# Patient Record
Sex: FEMALE | Race: WHITE | URBAN - METROPOLITAN AREA
[De-identification: names, ages, dates, MRNs, and addresses within clinical notes are randomized per-mention and may not be internally consistent; named-entity substitution may affect disease eponyms.]

---

## 2022-06-28 ENCOUNTER — OFFICE VISIT (OUTPATIENT)
Dept: FAMILY MEDICINE CLINIC | Facility: CLINIC | Age: 32
End: 2022-06-28
Payer: COMMERCIAL

## 2022-06-28 VITALS
BODY MASS INDEX: 26.84 KG/M2 | SYSTOLIC BLOOD PRESSURE: 120 MMHG | HEIGHT: 67 IN | WEIGHT: 171 LBS | DIASTOLIC BLOOD PRESSURE: 75 MMHG | HEART RATE: 64 BPM

## 2022-06-28 DIAGNOSIS — E11.9 TYPE 2 DIABETES MELLITUS WITHOUT COMPLICATION, WITH LONG-TERM CURRENT USE OF INSULIN (HCC): ICD-10-CM

## 2022-06-28 DIAGNOSIS — E03.9 ACQUIRED HYPOTHYROIDISM: ICD-10-CM

## 2022-06-28 DIAGNOSIS — Z76.89 ENCOUNTER TO ESTABLISH CARE: Primary | ICD-10-CM

## 2022-06-28 DIAGNOSIS — Z79.4 TYPE 2 DIABETES MELLITUS WITHOUT COMPLICATION, WITH LONG-TERM CURRENT USE OF INSULIN (HCC): ICD-10-CM

## 2022-06-28 PROCEDURE — 99203 OFFICE O/P NEW LOW 30 MIN: CPT | Performed by: NURSE PRACTITIONER

## 2022-06-28 PROCEDURE — 3078F DIAST BP <80 MM HG: CPT | Performed by: NURSE PRACTITIONER

## 2022-06-28 PROCEDURE — 3074F SYST BP LT 130 MM HG: CPT | Performed by: NURSE PRACTITIONER

## 2022-06-28 PROCEDURE — 3008F BODY MASS INDEX DOCD: CPT | Performed by: NURSE PRACTITIONER

## 2022-06-28 RX ORDER — INSULIN DETEMIR 100 [IU]/ML
25 INJECTION, SOLUTION SUBCUTANEOUS 2 TIMES DAILY
COMMUNITY
Start: 2022-04-28 | End: 2022-06-28

## 2022-06-28 RX ORDER — LEVOTHYROXINE SODIUM 0.07 MG/1
75 TABLET ORAL
COMMUNITY
End: 2022-06-28

## 2022-06-28 RX ORDER — LEVOTHYROXINE SODIUM 0.07 MG/1
75 TABLET ORAL
Qty: 30 TABLET | Refills: 0 | Status: SHIPPED | OUTPATIENT
Start: 2022-06-28

## 2022-06-28 RX ORDER — INSULIN DETEMIR 100 [IU]/ML
40 INJECTION, SOLUTION SUBCUTANEOUS 2 TIMES DAILY
Qty: 3 ML | Refills: 0 | Status: SHIPPED | OUTPATIENT
Start: 2022-06-28 | End: 2022-06-29

## 2022-06-28 RX ORDER — PEN NEEDLE, DIABETIC 31 GX3/16"
NEEDLE, DISPOSABLE MISCELLANEOUS
COMMUNITY
Start: 2022-04-28

## 2022-06-29 ENCOUNTER — TELEPHONE (OUTPATIENT)
Dept: FAMILY MEDICINE CLINIC | Facility: CLINIC | Age: 32
End: 2022-06-29

## 2022-06-29 DIAGNOSIS — Z76.89 ENCOUNTER TO ESTABLISH CARE: ICD-10-CM

## 2022-06-29 DIAGNOSIS — E11.9 TYPE 2 DIABETES MELLITUS WITHOUT COMPLICATION, WITH LONG-TERM CURRENT USE OF INSULIN (HCC): ICD-10-CM

## 2022-06-29 DIAGNOSIS — Z79.4 TYPE 2 DIABETES MELLITUS WITHOUT COMPLICATION, WITH LONG-TERM CURRENT USE OF INSULIN (HCC): ICD-10-CM

## 2022-06-29 RX ORDER — INSULIN DETEMIR 100 [IU]/ML
40 INJECTION, SOLUTION SUBCUTANEOUS 2 TIMES DAILY
Qty: 1 EACH | Refills: 0 | Status: SHIPPED | OUTPATIENT
Start: 2022-06-29

## 2022-06-30 ENCOUNTER — TELEPHONE (OUTPATIENT)
Dept: FAMILY MEDICINE CLINIC | Facility: CLINIC | Age: 32
End: 2022-06-30

## 2022-06-30 DIAGNOSIS — E03.9 ACQUIRED HYPOTHYROIDISM: ICD-10-CM

## 2022-06-30 DIAGNOSIS — Z79.4 TYPE 2 DIABETES MELLITUS WITHOUT COMPLICATION, WITH LONG-TERM CURRENT USE OF INSULIN (HCC): Primary | ICD-10-CM

## 2022-06-30 DIAGNOSIS — E11.9 TYPE 2 DIABETES MELLITUS WITHOUT COMPLICATION, WITH LONG-TERM CURRENT USE OF INSULIN (HCC): Primary | ICD-10-CM

## 2022-07-01 ENCOUNTER — PATIENT MESSAGE (OUTPATIENT)
Dept: OTHER | Age: 32
End: 2022-07-01

## 2022-07-01 NOTE — TELEPHONE ENCOUNTER
Patient was told that I could only provide a 30 day supply and that she needed to be seen and assessed with labs in order to prescribe further. Since she is a new patient she needs labs. I am not comfortable sending in an alternative without checking labs. It looks like she has an appt with Dr Mariella Jaramillo but not until September. I will place a referral order for her to follow up with endocrinology sooner with Sofie Cantrell to manage diabetes.

## 2022-07-02 ENCOUNTER — TELEPHONE (OUTPATIENT)
Dept: FAMILY MEDICINE CLINIC | Facility: CLINIC | Age: 32
End: 2022-07-02

## 2022-07-02 NOTE — TELEPHONE ENCOUNTER
exenatide 10 MCG/0.04ML Subcutaneous Solution Pen-injector, Inject 0.04 mL (10 mcg total) into the skin 2 (two) times daily before meals. , Disp: 2.4 mL, Rfl: 0

## 2022-07-05 NOTE — TELEPHONE ENCOUNTER
From: Corinna Campos  To: Aamir Brought  Sent: 7/1/2022 10:20 AM CDT  Subject: Referral to [facility],  Your insurance is not covering insulin at this time. Pedro Luis Boyce placed a referral to Endocrinology and you will need to have your hemoglobin a1c checked. She would like to schedule with first available at your earliest convenience to avoid delays in your care.  Please contact Endo, see information below:  Beena Kenny, [phone]  Take Care,  76 [facility] Shabana Jay

## 2022-08-15 DIAGNOSIS — E11.9 TYPE 2 DIABETES MELLITUS WITHOUT COMPLICATION, WITH LONG-TERM CURRENT USE OF INSULIN (HCC): ICD-10-CM

## 2022-08-15 DIAGNOSIS — Z79.4 TYPE 2 DIABETES MELLITUS WITHOUT COMPLICATION, WITH LONG-TERM CURRENT USE OF INSULIN (HCC): ICD-10-CM

## 2022-08-15 DIAGNOSIS — Z76.89 ENCOUNTER TO ESTABLISH CARE: ICD-10-CM

## 2022-08-16 RX ORDER — INSULIN DETEMIR 100 [IU]/ML
40 INJECTION, SOLUTION SUBCUTANEOUS 2 TIMES DAILY
Qty: 1 EACH | Refills: 3 | Status: SHIPPED | OUTPATIENT
Start: 2022-08-16 | End: 2022-08-16

## 2022-08-16 RX ORDER — INSULIN DETEMIR 100 [IU]/ML
40 INJECTION, SOLUTION SUBCUTANEOUS 2 TIMES DAILY
Qty: 15 ML | Refills: 3 | Status: SHIPPED | OUTPATIENT
Start: 2022-08-16

## 2022-08-16 NOTE — TELEPHONE ENCOUNTER
[facility] incoming RX fax  ALTERNATIVE REQUESTED  Pharmacy comments: Alternative requested:  Please specify quantity.   Can only dispense in increments of  boxes of 15ML/box or 5 pens/Box

## 2022-08-23 ENCOUNTER — OFFICE VISIT (OUTPATIENT)
Dept: OBGYN CLINIC | Facility: CLINIC | Age: 32
End: 2022-08-23
Payer: COMMERCIAL

## 2022-08-23 VITALS
HEART RATE: 58 BPM | WEIGHT: 168.19 LBS | SYSTOLIC BLOOD PRESSURE: 161 MMHG | DIASTOLIC BLOOD PRESSURE: 88 MMHG | BODY MASS INDEX: 26 KG/M2

## 2022-08-23 DIAGNOSIS — Z01.419 ENCOUNTER FOR GYNECOLOGICAL EXAMINATION: Primary | ICD-10-CM

## 2022-08-23 PROCEDURE — 3079F DIAST BP 80-89 MM HG: CPT | Performed by: OBSTETRICS & GYNECOLOGY

## 2022-08-23 PROCEDURE — 3077F SYST BP >= 140 MM HG: CPT | Performed by: OBSTETRICS & GYNECOLOGY

## 2022-08-23 PROCEDURE — 99385 PREV VISIT NEW AGE 18-39: CPT | Performed by: OBSTETRICS & GYNECOLOGY

## 2022-08-24 LAB — HPV I/H RISK 1 DNA SPEC QL NAA+PROBE: NEGATIVE

## 2022-09-06 ENCOUNTER — OFFICE VISIT (OUTPATIENT)
Dept: ENDOCRINOLOGY CLINIC | Facility: CLINIC | Age: 32
End: 2022-09-06
Payer: COMMERCIAL

## 2022-09-06 VITALS
DIASTOLIC BLOOD PRESSURE: 80 MMHG | HEART RATE: 60 BPM | WEIGHT: 167 LBS | BODY MASS INDEX: 26 KG/M2 | SYSTOLIC BLOOD PRESSURE: 123 MMHG

## 2022-09-06 DIAGNOSIS — E11.9 TYPE 2 DIABETES MELLITUS WITHOUT COMPLICATION, WITH LONG-TERM CURRENT USE OF INSULIN (HCC): ICD-10-CM

## 2022-09-06 DIAGNOSIS — E03.9 ACQUIRED HYPOTHYROIDISM: ICD-10-CM

## 2022-09-06 DIAGNOSIS — Z76.89 ENCOUNTER TO ESTABLISH CARE: ICD-10-CM

## 2022-09-06 DIAGNOSIS — E11.65 UNCONTROLLED TYPE 2 DIABETES MELLITUS WITH HYPERGLYCEMIA (HCC): Primary | ICD-10-CM

## 2022-09-06 DIAGNOSIS — Z79.4 TYPE 2 DIABETES MELLITUS WITHOUT COMPLICATION, WITH LONG-TERM CURRENT USE OF INSULIN (HCC): ICD-10-CM

## 2022-09-06 LAB
CARTRIDGE LOT#: NORMAL NUMERIC
GLUCOSE BLOOD: 315
TEST STRIP LOT #: NORMAL NUMERIC

## 2022-09-06 PROCEDURE — 83036 HEMOGLOBIN GLYCOSYLATED A1C: CPT | Performed by: NURSE PRACTITIONER

## 2022-09-06 PROCEDURE — 3044F HG A1C LEVEL LT 7.0%: CPT | Performed by: NURSE PRACTITIONER

## 2022-09-06 PROCEDURE — 36416 COLLJ CAPILLARY BLOOD SPEC: CPT | Performed by: NURSE PRACTITIONER

## 2022-09-06 PROCEDURE — 3074F SYST BP LT 130 MM HG: CPT | Performed by: NURSE PRACTITIONER

## 2022-09-06 PROCEDURE — 3079F DIAST BP 80-89 MM HG: CPT | Performed by: NURSE PRACTITIONER

## 2022-09-06 PROCEDURE — 99214 OFFICE O/P EST MOD 30 MIN: CPT | Performed by: NURSE PRACTITIONER

## 2022-09-06 PROCEDURE — 82947 ASSAY GLUCOSE BLOOD QUANT: CPT | Performed by: NURSE PRACTITIONER

## 2022-09-06 RX ORDER — SEMAGLUTIDE 1.34 MG/ML
0.5 INJECTION, SOLUTION SUBCUTANEOUS WEEKLY
Qty: 4.5 ML | Refills: 0 | Status: SHIPPED | OUTPATIENT
Start: 2022-09-06

## 2022-09-06 NOTE — PATIENT INSTRUCTIONS
A1C: 9.6% today  Blood glucose: 315 in clinic today    Medications:   -continue with Metformin 1,000mg twice daily   -increase Levemir 40--> 48 units once daily before dinner meal   -stop Byetta  -start ozempic 0.5mg once weekly   Common side effects:    Nausea or diarrhea    These effects usually go away over time as your body gets used to the medicine      Here are some things that might help your nausea go away:   Eat small amounts of food instrad of few large meals   Eat plain, bland non greasy foods    Drink plenty of fluids (sugar free)    Avoid foods and smells that might make you sick    Eat slowly and listen to your hunger      -Have labs drawn as soon as able   -Please give me an update on Thursday 9/8    Weight:  Wt Readings from Last 6 Encounters:  08/23/22 : 168 lb 3.2 oz (76.3 kg)  06/28/22 : 171 lb (77.6 kg)    A1C goal:  <7.0%    Blood sugar testing:  Test your blood sugar 1 time daily   Recommended times to test: alternate with before breakfast (fasting) or 2hrs after meals     Blood sugar targets:  Before breakfast:   (preferably < 110)  2 hours after meals: <150    Call for persistent blood sugars < 75 or > 200

## 2022-09-08 NOTE — TELEPHONE ENCOUNTER
Please review Protocol Failed/No Protocol        Requested Prescriptions   Pending Prescriptions Disp Refills    METFORMIN HCL 1000 MG Oral Tab [Pharmacy Med Name: METFORMIN HCL 1,000 MG TABLET] 60 tablet 0     Sig: TAKE 1 TABLET BY MOUTH TWICE A DAY WITH MEALS        Diabetes Medication Protocol Failed - 9/6/2022  7:50 PM        Failed - Last A1C < 7.5 and within past 6 months     Lab Results   Component Value Date    A1C 9.6% 09/06/2022               Failed - GFR > 50     No results found for: EGFRCR              Failed - GFR in the past 12 months        Passed - In person appointment or virtual visit in the past 6 mos or appointment in next 3 mos       Recent Outpatient Visits              Yesterday Uncontrolled type 2 diabetes mellitus with hyperglycemia [facility])    [facility] MOY Nance    Office Visit    2 weeks ago Encounter for gynecological examination    [facility] for Chiara hSerman MD    Office Visit    2 months ago Encounter to [facility], [place] [address], [address] [place] [address] Edgar Moser APRN    Office Visit     Future Appointments         Provider Department Appt Notes    In 3 weeks Katie Lacy MD [facility], [place] [address], [place]                   LEVOTHYROXINE 75 MCG Oral Tab [Pharmacy Med Name: LEVOTHYROXINE 75 MCG TABLET] 30 tablet 0     Sig: TAKE 1 [address]         Thyroid Medication Protocol Failed - 9/6/2022  7:50 PM        Failed - TSH in past 12 months        Failed - Last TSH value is normal     No results found for: TSH, THYROIDFUNC, TSHT4              Passed - In person appointment or virtual visit in the past 12 mos or appointment in next 3 mos       Recent Outpatient Visits              Yesterday Uncontrolled type 2 diabetes mellitus with hyperglycemia [facility])    [facility] MOY Nance    Office Visit    2 weeks ago Encounter for gynecological examination [facility] [place] [facility] for Zeenat Calderon MD    Office Visit    2 months ago Encounter to establish care    Madeleine Massey, [address] [place] [address] Laxmi Moser APRN    Office Visit     Future Appointments         Provider Department Appt Notes    In 3 weeks MD Rigoberto Newton [place], [address] [facility]                      Future Appointments         Provider Department Appt Notes    In 3 weeks MD Rigoberto [place], [address] [facility] Visits              Yesterday Uncontrolled type 2 diabetes mellitus with hyperglycemia [facility])    [employer] [facility] MOY Vasquez    Office Visit    2 weeks ago Encounter for gynecological examination    [facility] [place] [facility] for Zeenat Calderon MD    Office Visit    2 months ago Encounter to establish care    [address] Laxmi Moser, MOY    Office Visit

## 2022-09-12 RX ORDER — LEVOTHYROXINE SODIUM 0.07 MG/1
TABLET ORAL
Qty: 90 TABLET | Refills: 0 | Status: SHIPPED | OUTPATIENT
Start: 2022-09-12

## 2022-09-28 ENCOUNTER — OFFICE VISIT (OUTPATIENT)
Dept: FAMILY MEDICINE CLINIC | Facility: CLINIC | Age: 32
End: 2022-09-28

## 2022-09-28 VITALS
DIASTOLIC BLOOD PRESSURE: 77 MMHG | BODY MASS INDEX: 26.23 KG/M2 | SYSTOLIC BLOOD PRESSURE: 123 MMHG | WEIGHT: 163.19 LBS | HEART RATE: 80 BPM | TEMPERATURE: 98 F | HEIGHT: 66.25 IN

## 2022-09-28 DIAGNOSIS — E03.9 ACQUIRED HYPOTHYROIDISM: ICD-10-CM

## 2022-09-28 DIAGNOSIS — R45.89 DEPRESSED MOOD: ICD-10-CM

## 2022-09-28 DIAGNOSIS — Z79.4 TYPE 2 DIABETES MELLITUS WITHOUT COMPLICATION, WITH LONG-TERM CURRENT USE OF INSULIN (HCC): ICD-10-CM

## 2022-09-28 DIAGNOSIS — R11.0 NAUSEA: ICD-10-CM

## 2022-09-28 DIAGNOSIS — E11.9 TYPE 2 DIABETES MELLITUS WITHOUT COMPLICATION, WITH LONG-TERM CURRENT USE OF INSULIN (HCC): ICD-10-CM

## 2022-09-28 DIAGNOSIS — Z00.00 ROUTINE MEDICAL EXAM: Primary | ICD-10-CM

## 2022-09-28 DIAGNOSIS — Z76.89 ENCOUNTER TO ESTABLISH CARE: ICD-10-CM

## 2022-09-28 PROCEDURE — 99395 PREV VISIT EST AGE 18-39: CPT | Performed by: FAMILY MEDICINE

## 2022-09-28 PROCEDURE — 3008F BODY MASS INDEX DOCD: CPT | Performed by: FAMILY MEDICINE

## 2022-09-28 PROCEDURE — 3074F SYST BP LT 130 MM HG: CPT | Performed by: FAMILY MEDICINE

## 2022-09-28 PROCEDURE — 3078F DIAST BP <80 MM HG: CPT | Performed by: FAMILY MEDICINE

## 2022-09-28 RX ORDER — LEVOTHYROXINE SODIUM 0.07 MG/1
75 TABLET ORAL
Qty: 90 TABLET | Refills: 1 | Status: SHIPPED | OUTPATIENT
Start: 2022-09-28

## 2022-09-28 RX ORDER — OMEPRAZOLE 20 MG/1
20 CAPSULE, DELAYED RELEASE ORAL EVERY EVENING
Qty: 30 CAPSULE | Refills: 0 | Status: SHIPPED | OUTPATIENT
Start: 2022-09-28 | End: 2023-09-28

## 2022-09-28 RX ORDER — ONDANSETRON 4 MG/1
4 TABLET, ORALLY DISINTEGRATING ORAL EVERY 8 HOURS PRN
Qty: 20 TABLET | Refills: 0 | Status: SHIPPED | OUTPATIENT
Start: 2022-09-28

## 2022-09-29 ENCOUNTER — TELEPHONE (OUTPATIENT)
Dept: FAMILY MEDICINE CLINIC | Facility: CLINIC | Age: 32
End: 2022-09-29

## 2022-09-29 ENCOUNTER — TELEPHONE (OUTPATIENT)
Dept: ENDOCRINOLOGY CLINIC | Facility: CLINIC | Age: 32
End: 2022-09-29

## 2022-09-29 NOTE — TELEPHONE ENCOUNTER
Per provider pt stated she received labs through [employer] [facility]. As of 9/29/22 our office has not received pt's labs. Called pt to see which [facility] she went to so that our office can call that specific [facility] to have them fax over pt's lab results to our office.  No answer, kingb

## 2022-09-29 NOTE — TELEPHONE ENCOUNTER
Shabana Trujillo MD  Hi Dr. Dahlia Guerra,     On 9-29-22, the following referrals for therapy were provided to the patient:     Tracee Christ[address], 238 [facility]   Phone: [phone], [address] [facility], [address]   Phone: [address]   [address], [address]   [place], [address]   Phone: [employer], [address] 8892 [facility], 1500 [facility]   Phone: 239 [facility], 1202 [facility], [address] [facility] [address] 6501 [facility] 1850 [facility], 385 [facility]   Phone: [phone]       I have provided my contact information if additional resources are needed. I am closing the order at this time. Please feel free to re-refer the patient for navigation as needed. Thank you,     Clarence Najera M.S., [facility], [address] [phone]   [email or web address]

## 2022-10-01 LAB
ALBUMIN/GLOBULIN RATIO: 1.7 (CALC) (ref 1–2.5)
ALBUMIN: 4.3 G/DL (ref 3.6–5.1)
ALKALINE PHOSPHATASE: 68 U/L (ref 31–125)
ALT: 15 U/L (ref 6–29)
AST: 14 U/L (ref 10–30)
BILIRUBIN, TOTAL: 0.4 MG/DL (ref 0.2–1.2)
BUN: 10 MG/DL (ref 7–25)
CALCIUM: 9.5 MG/DL (ref 8.6–10.2)
CARBON DIOXIDE: 26 MMOL/L (ref 20–32)
CHLORIDE: 103 MMOL/L (ref 98–110)
CHOL/HDLC RATIO: 3.5 (CALC)
CHOLESTEROL, TOTAL: 166 MG/DL
CREATININE, RANDOM URINE: 117 MG/DL (ref 20–275)
CREATININE: 0.75 MG/DL (ref 0.5–0.97)
EGFR: 108 ML/MIN/1.73M2
GLOBULIN: 2.6 G/DL (CALC) (ref 1.9–3.7)
GLUCOSE: 158 MG/DL (ref 65–99)
GLUTAMIC ACID DECARBOXYLASE 65 AB: <5 IU/ML
HDL CHOLESTEROL: 47 MG/DL
HEMATOCRIT: 41.1 % (ref 35–45)
HEMOGLOBIN: 13.5 G/DL (ref 11.7–15.5)
LDL-CHOLESTEROL: 100 MG/DL (CALC)
MCH: 27.2 PG (ref 27–33)
MCHC: 32.8 G/DL (ref 32–36)
MCV: 82.9 FL (ref 80–100)
MICROALBUMIN/CREATININE RATIO, RANDOM URINE: 4 MCG/MG CREAT
MICROALBUMIN: 0.5 MG/DL
MPV: 9.6 FL (ref 7.5–12.5)
NON-HDL CHOLESTEROL: 119 MG/DL (CALC)
PLATELET COUNT: 419 THOUSAND/UL (ref 140–400)
POTASSIUM: 4.2 MMOL/L (ref 3.5–5.3)
PROTEIN, TOTAL: 6.9 G/DL (ref 6.1–8.1)
RDW: 14.3 % (ref 11–15)
RED BLOOD CELL COUNT: 4.96 MILLION/UL (ref 3.8–5.1)
SODIUM: 139 MMOL/L (ref 135–146)
T4, FREE: 1.5 NG/DL (ref 0.8–1.8)
TRIGLYCERIDES: 95 MG/DL
TSH: 4.61 MIU/L
WHITE BLOOD CELL COUNT: 7.8 THOUSAND/UL (ref 3.8–10.8)
ZINC TRANSPORTER 8 (ZNT8) ANTIBODY: <10 U/ML

## 2022-10-03 NOTE — TELEPHONE ENCOUNTER
Called pt to confirm which [facility] she had gone to for labs pt states the lab results were uploaded in her [address] This MA looked at labs and confirmed with pt we received results collected on 9/24/22 pt confirmed that's the day she got her labs collected. Informed pt that provider is currently out of the office today and will most likely hear back tomorrow or Wednesday. Pt verbalized understanding. Per LOV on 9/6/22 - RTC: 6 Weeks pt stated she would like to wait until provider looks over results to make an appt.      Routing over to provider

## 2022-10-20 RX ORDER — LEVOTHYROXINE SODIUM 88 UG/1
88 TABLET ORAL
Qty: 90 TABLET | Refills: 0 | Status: SHIPPED | OUTPATIENT
Start: 2022-10-20 | End: 2022-12-19

## 2022-10-20 NOTE — TELEPHONE ENCOUNTER
Molly,    [address] [place] [address] 9/6/22    RTC in 6 weeks, patient was to repeat labs again. No f/u scheduled. Your next available is 11/1, 11/3, ok to book?

## 2022-10-21 NOTE — TELEPHONE ENCOUNTER
Attempted to call patient, unable to leave message. [employer] sent. Per result note, levothyroxine increased to 88mcg daily and repeat labs in 2 months. On or after 11/4/22.

## 2022-11-22 RX ORDER — SEMAGLUTIDE 1.34 MG/ML
0.5 INJECTION, SOLUTION SUBCUTANEOUS WEEKLY
Qty: 4.5 ML | Refills: 0 | Status: SHIPPED | OUTPATIENT
Start: 2022-11-22 | End: 2022-11-22

## 2022-11-23 ENCOUNTER — TELEPHONE (OUTPATIENT)
Dept: FAMILY MEDICINE CLINIC | Facility: CLINIC | Age: 32
End: 2022-11-23

## 2022-11-23 NOTE — TELEPHONE ENCOUNTER
Colonel Varghese -Nurse case management stated she -Provided short term case management   She Spoke to [employer] [facility] today, doing great , working with website for medication to help with co- pay, stay on top of OV and with specialist , has met all needs for assistance and  pt verbalized understanding

## 2022-11-23 NOTE — TELEPHONE ENCOUNTER
Future Appointments   Date Time Provider Khadijah Landin   1/31/2023  5:30 PM [facility]

## 2023-01-31 ENCOUNTER — OFFICE VISIT (OUTPATIENT)
Dept: ENDOCRINOLOGY CLINIC | Facility: CLINIC | Age: 33
End: 2023-01-31

## 2023-01-31 VITALS
DIASTOLIC BLOOD PRESSURE: 75 MMHG | WEIGHT: 166 LBS | HEART RATE: 80 BPM | SYSTOLIC BLOOD PRESSURE: 118 MMHG | BODY MASS INDEX: 27 KG/M2

## 2023-01-31 DIAGNOSIS — E03.9 ACQUIRED HYPOTHYROIDISM: ICD-10-CM

## 2023-01-31 DIAGNOSIS — E11.65 UNCONTROLLED TYPE 2 DIABETES MELLITUS WITH HYPERGLYCEMIA (HCC): Primary | ICD-10-CM

## 2023-01-31 LAB
CARTRIDGE LOT#: ABNORMAL NUMERIC
GLUCOSE BLOOD: 293
HEMOGLOBIN A1C: 10.6 % (ref 4.3–5.6)
TEST STRIP LOT #: NORMAL NUMERIC

## 2023-01-31 PROCEDURE — 83036 HEMOGLOBIN GLYCOSYLATED A1C: CPT | Performed by: NURSE PRACTITIONER

## 2023-01-31 PROCEDURE — 3046F HEMOGLOBIN A1C LEVEL >9.0%: CPT | Performed by: NURSE PRACTITIONER

## 2023-01-31 PROCEDURE — 3078F DIAST BP <80 MM HG: CPT | Performed by: NURSE PRACTITIONER

## 2023-01-31 PROCEDURE — 99214 OFFICE O/P EST MOD 30 MIN: CPT | Performed by: NURSE PRACTITIONER

## 2023-01-31 PROCEDURE — 3074F SYST BP LT 130 MM HG: CPT | Performed by: NURSE PRACTITIONER

## 2023-01-31 PROCEDURE — 82947 ASSAY GLUCOSE BLOOD QUANT: CPT | Performed by: NURSE PRACTITIONER

## 2023-01-31 RX ORDER — SEMAGLUTIDE 1.34 MG/ML
1 INJECTION, SOLUTION SUBCUTANEOUS WEEKLY
Qty: 9 ML | Refills: 0 | Status: SHIPPED | OUTPATIENT
Start: 2023-01-31

## 2023-01-31 NOTE — PATIENT INSTRUCTIONS
A1C: 10.6% today --> increased from 9.6% on 9/6/2022  Blood glucose: 293 in clinic today    Medications:   -continue with Metformin 1,000mg twice daily   -start Levemir 16 units once daily before dinner meal  -increase Ozempic 0.5-->1mg once weekly on wednesdays     -lets work on limiting carbohydrates to 30-45gm per meal/ max 135gm per day  -avoid eating snacks between meals  -avoid eating sweets or soda/fruit juices or Gatorade or energy drinks   -eat dinner at least 2 hours prior to going to bed at night  - increase (aerobic) exercise to at least 4-5 days per week for at least 30 mins each session    -avoid going more than 2 consecutive days of no exercise    -please repeat thyroid labs at your convinience - no fasting needed     -schedule apt with Wagner Mir (diabetes educator) within 1 week    Weight:  Wt Readings from Last 6 Encounters:  01/31/23 : 166 lb (75.3 kg)  09/28/22 : 163 lb 3.2 oz (74 kg)  09/06/22 : 167 lb (75.8 kg)  08/23/22 : 168 lb 3.2 oz (76.3 kg)  06/28/22 : 171 lb (77.6 kg)    A1C goal:  <7.0%    Blood sugar testing:  Test your blood sugar 1 time daily   Recommended times to test: Before breakfast (fasting) or 2hrs after meals     Blood sugar targets:  Before breakfast:   (preferably < 110)  2 hours after meals: <150     Call for persistent blood sugars < 75 or > 200.

## 2023-02-03 NOTE — TELEPHONE ENCOUNTER
LOV:01/31/23    RTC:1week    F/U:02/09/23, 03/28/23     Pending Monthly Supply:orders pending, please approve if appropriate.

## 2023-02-06 RX ORDER — LEVOTHYROXINE SODIUM 88 UG/1
TABLET ORAL
Qty: 90 TABLET | Refills: 0 | Status: SHIPPED | OUTPATIENT
Start: 2023-02-06

## 2023-02-23 ENCOUNTER — TELEPHONE (OUTPATIENT)
Dept: ENDOCRINOLOGY CLINIC | Facility: CLINIC | Age: 33
End: 2023-02-23

## 2023-02-23 NOTE — TELEPHONE ENCOUNTER
Called patient back and was told she already rescheduled with the phone room. She is just concern that she might get charged late cancellation free. Pt states she's feeling a little under the weather today and did not want to get anyone sick. Reached out to call room lead and was told charges only apply if patients cancelled or no shows AFTER appointment time.

## 2023-03-15 RX ORDER — LEVOTHYROXINE SODIUM 88 UG/1
TABLET ORAL
Qty: 90 TABLET | Refills: 0 | Status: SHIPPED | OUTPATIENT
Start: 2023-03-15

## 2023-03-15 NOTE — TELEPHONE ENCOUNTER
LOV: 1/31/2023    RTC: 1 week    F/U: 3/23/2023    MOY Calderon-- orders pending, approve if agreeable.

## 2023-03-23 ENCOUNTER — NURSE ONLY (OUTPATIENT)
Dept: ENDOCRINOLOGY CLINIC | Facility: CLINIC | Age: 33
End: 2023-03-23

## 2023-03-23 DIAGNOSIS — E11.65 UNCONTROLLED TYPE 2 DIABETES MELLITUS WITH HYPERGLYCEMIA (HCC): Primary | ICD-10-CM

## 2023-03-23 PROCEDURE — 99211 OFF/OP EST MAY X REQ PHY/QHP: CPT | Performed by: NURSE PRACTITIONER

## 2023-03-28 ENCOUNTER — OFFICE VISIT (OUTPATIENT)
Dept: ENDOCRINOLOGY CLINIC | Facility: CLINIC | Age: 33
End: 2023-03-28

## 2023-03-28 VITALS
DIASTOLIC BLOOD PRESSURE: 73 MMHG | SYSTOLIC BLOOD PRESSURE: 109 MMHG | WEIGHT: 168 LBS | HEART RATE: 67 BPM | BODY MASS INDEX: 27 KG/M2

## 2023-03-28 DIAGNOSIS — E11.65 UNCONTROLLED TYPE 2 DIABETES MELLITUS WITH HYPERGLYCEMIA (HCC): Primary | ICD-10-CM

## 2023-03-28 LAB
CARTRIDGE LOT#: ABNORMAL NUMERIC
GLUCOSE BLOOD: 122
HEMOGLOBIN A1C: 8.2 % (ref 4.3–5.6)
TEST STRIP LOT #: NORMAL NUMERIC

## 2023-03-28 PROCEDURE — 3052F HG A1C>EQUAL 8.0%<EQUAL 9.0%: CPT | Performed by: NURSE PRACTITIONER

## 2023-03-28 PROCEDURE — 82947 ASSAY GLUCOSE BLOOD QUANT: CPT | Performed by: NURSE PRACTITIONER

## 2023-03-28 PROCEDURE — 3074F SYST BP LT 130 MM HG: CPT | Performed by: NURSE PRACTITIONER

## 2023-03-28 PROCEDURE — 99214 OFFICE O/P EST MOD 30 MIN: CPT | Performed by: NURSE PRACTITIONER

## 2023-03-28 PROCEDURE — 3078F DIAST BP <80 MM HG: CPT | Performed by: NURSE PRACTITIONER

## 2023-03-28 PROCEDURE — 83036 HEMOGLOBIN GLYCOSYLATED A1C: CPT | Performed by: NURSE PRACTITIONER

## 2023-03-28 RX ORDER — LEVOTHYROXINE SODIUM 88 UG/1
88 TABLET ORAL
Qty: 90 TABLET | Refills: 0 | Status: SHIPPED | OUTPATIENT
Start: 2023-03-28

## 2023-03-28 RX ORDER — INSULIN DETEMIR 100 [IU]/ML
20 INJECTION, SOLUTION SUBCUTANEOUS DAILY
Qty: 18 ML | Refills: 0 | Status: SHIPPED | OUTPATIENT
Start: 2023-03-28 | End: 2023-06-26

## 2023-03-28 RX ORDER — EMPAGLIFLOZIN 25 MG/1
1 TABLET, FILM COATED ORAL DAILY
Qty: 90 TABLET | Refills: 0 | Status: SHIPPED | OUTPATIENT
Start: 2023-03-28

## 2023-03-28 NOTE — PATIENT INSTRUCTIONS
A1C: 8.2% today -->decreased from 10.6% on 1/31/2023  Blood glucose: 122 in clinic today    Medications:   -continue with Metformin 1,000mg twice daily   - increase Levemir 16 --> 20 units once nightly   -start Jardiance 25mg once daily in Morning    - please drink an extra 1-2 per day   -continue with Ozempic 1mg once weekly on wednesdays until home supply is finished   - then increase to ozempic 2mg once weekly     A1C goal:  <7.0%    Blood sugar testing:  Test your blood sugar 2 times daily   Recommended times to test: Before breakfast (fasting) and alternate with 2hrs after meals     Blood sugar targets:  Before breakfast:   (preferably < 110)  2 hours after meals: <150     Call for persistent blood sugars < 75 or > 200

## 2023-05-30 ENCOUNTER — OFFICE VISIT (OUTPATIENT)
Dept: ENDOCRINOLOGY CLINIC | Facility: CLINIC | Age: 33
End: 2023-05-30

## 2023-05-30 VITALS
HEART RATE: 72 BPM | DIASTOLIC BLOOD PRESSURE: 62 MMHG | WEIGHT: 160 LBS | BODY MASS INDEX: 26 KG/M2 | SYSTOLIC BLOOD PRESSURE: 117 MMHG

## 2023-05-30 DIAGNOSIS — E11.65 UNCONTROLLED TYPE 2 DIABETES MELLITUS WITH HYPERGLYCEMIA (HCC): Primary | ICD-10-CM

## 2023-05-30 LAB
CARTRIDGE LOT#: ABNORMAL NUMERIC
GLUCOSE BLOOD: 187
HEMOGLOBIN A1C: 6.3 % (ref 4.3–5.6)
TEST STRIP LOT #: NORMAL NUMERIC

## 2023-05-30 PROCEDURE — 99214 OFFICE O/P EST MOD 30 MIN: CPT | Performed by: NURSE PRACTITIONER

## 2023-05-30 PROCEDURE — 3044F HG A1C LEVEL LT 7.0%: CPT | Performed by: NURSE PRACTITIONER

## 2023-05-30 PROCEDURE — 3074F SYST BP LT 130 MM HG: CPT | Performed by: NURSE PRACTITIONER

## 2023-05-30 PROCEDURE — 3078F DIAST BP <80 MM HG: CPT | Performed by: NURSE PRACTITIONER

## 2023-05-30 PROCEDURE — 83036 HEMOGLOBIN GLYCOSYLATED A1C: CPT | Performed by: NURSE PRACTITIONER

## 2023-05-30 PROCEDURE — 82947 ASSAY GLUCOSE BLOOD QUANT: CPT | Performed by: NURSE PRACTITIONER

## 2023-05-30 RX ORDER — LANCETS 33 GAUGE
EACH MISCELLANEOUS
Qty: 100 EACH | Refills: 1 | Status: SHIPPED | OUTPATIENT
Start: 2023-05-30

## 2023-05-30 RX ORDER — BLOOD-GLUCOSE METER
1 EACH MISCELLANEOUS DAILY
Qty: 1 KIT | Refills: 0 | Status: SHIPPED | OUTPATIENT
Start: 2023-05-30

## 2023-05-30 RX ORDER — BLOOD SUGAR DIAGNOSTIC
STRIP MISCELLANEOUS
Qty: 100 STRIP | Refills: 1 | Status: SHIPPED | OUTPATIENT
Start: 2023-05-30

## 2023-05-30 RX ORDER — SEMAGLUTIDE 1.34 MG/ML
1 INJECTION, SOLUTION SUBCUTANEOUS WEEKLY
Qty: 9 ML | Refills: 0 | Status: SHIPPED | OUTPATIENT
Start: 2023-05-30

## 2023-05-30 NOTE — PATIENT INSTRUCTIONS
A1C: 6.3% today --> decreased from 8.2% on 3/28/2023  Blood glucose: 187 in clinic today    Medications:   -continue with Metformin 1,000mg twice daily   -continue with Levemir 20 units nightly   -continue with Jardiance 25mg once daily in AM   -continue with Ozempic 1mg once weekly on wednesdays    Please give me an update on your blood glucose readings in 1-2 weeks     [facility] 1 [facility] before planning pregnancy to transition over to insulin and stop ozempic/jardiance. Also start continuous glucometer     Weight:  Wt Readings from Last 6 Encounters:  05/30/23 : 160 lb (72.6 kg)  03/28/23 : 168 lb (76.2 kg)  01/31/23 : 166 lb (75.3 kg)  09/28/22 : 163 lb 3.2 oz (74 kg)  09/06/22 : 167 lb (75.8 kg)  08/23/22 : 168 lb 3.2 oz (76.3 kg)    A1C goal:   <7.0%    Blood sugar testing:  Test your blood sugar 1 time daily  Recommended times to test: alternate with before breakfast (fasting) or 2hrs after meals     Blood sugar targets:  Before breakfast:   (preferably < 110)  Before meals OR 2 hours after meals: <150    Pregnancy blood glucose target:   Before breakfast: <90  2hrs after meals: <120     Call for persistent blood sugars < 75 or > 200.

## 2023-07-03 RX ORDER — EMPAGLIFLOZIN 25 MG/1
1 TABLET, FILM COATED ORAL DAILY
Qty: 90 TABLET | Refills: 0 | Status: SHIPPED | OUTPATIENT
Start: 2023-07-03

## 2023-07-20 DIAGNOSIS — E11.9 TYPE 2 DIABETES MELLITUS WITHOUT COMPLICATIONS (HCC): ICD-10-CM

## 2023-07-20 DIAGNOSIS — Z76.89 PERSONS ENCOUNTERING HEALTH SERVICES IN OTHER SPECIFIED CIRCUMSTANCES: ICD-10-CM

## 2023-07-20 RX ORDER — SEMAGLUTIDE 1.34 MG/ML
1 INJECTION, SOLUTION SUBCUTANEOUS
Qty: 9 ML | Refills: 0 | Status: SHIPPED | OUTPATIENT
Start: 2023-07-20

## 2023-07-21 NOTE — TELEPHONE ENCOUNTER
Please review; protocol failed. Or has no protocol    Listed as historical  Requested Prescriptions   Pending Prescriptions Disp Refills    METFORMIN HCL 1000 MG Oral Tab [Pharmacy Med Name: METFORMIN HCL 1,000 MG TABLET] 180 tablet 0     Sig: TAKE 1 TABLET BY MOUTH TWICE A DAY WITH MEALS       Diabetes Medication Protocol Passed - 7/20/2023 10:21 AM        Passed - Last A1C < 7.5 and within past 6 months     Lab Results   Component Value Date    A1C 6.3 (A) 05/30/2023             Passed - In person appointment or virtual visit in the past 6 mos or appointment in next 3 mos     Recent Outpatient Visits              1 month ago Uncontrolled type 2 diabetes mellitus with hyperglycemia [facility])    [address], [address] [facility], [facility], [facility]    Office Visit    3 months ago Uncontrolled type 2 diabetes mellitus with hyperglycemia [facility])    [address], [address] [facility], [facility], [facility]    Office Visit    4 months ago Uncontrolled type 2 diabetes mellitus with hyperglycemia ([facility] [address]    [address], [address] [facility] [place]    Nurse Only    5 months ago Uncontrolled type 2 diabetes mellitus with hyperglycemia [facility])    [address], [address] [facility], [facility], [facility]    Office Visit    9 months ago Routine medical exam    [address] [facility], Tyrese Cam MD    Office Visit          Future Appointments         Provider Department Appt Notes    In 1 [place] [address] [facility], [place] Pain in bottom right abdomen. Lower side.     In 3 months [facility], [place] [address] [facility], [facility] Diabetic follow-up and prescription refills               Passed - EGFRCR or GFRNAA > 50     GFR Evaluation            Passed - GFR in the past 12 months              Recent Outpatient Visits              1 month ago Uncontrolled type 2 diabetes mellitus with hyperglycemia [facility])    [place], Leonid, Elizabeth, APRN    Office Visit    3 months ago Uncontrolled type 2 diabetes mellitus with hyperglycemia [facility])    [address], [address] [facility] [facility], Elizabeth, APRN    Office Visit    4 months ago Uncontrolled type 2 diabetes mellitus with hyperglycemia ([address]    [address], [address] [facility], [place]    Nurse Only    5 months ago Uncontrolled type 2 diabetes mellitus with hyperglycemia [facility])    [address], [address] [facility], [place], Elizabeth, APRN    Office Visit    9 months ago Routine medical exam    [address] [facility], Kriss Cox MD    Office Visit           Future Appointments         Provider Department Appt Notes    In 1 month Marlene Lopez MD [address] [facility], [place] Pain in bottom right abdomen. Lower side.     In 3 months [facility], [place] [address] [facility] Diabetic follow-up and prescription refills

## 2023-08-30 ENCOUNTER — OFFICE VISIT (OUTPATIENT)
Dept: FAMILY MEDICINE CLINIC | Facility: CLINIC | Age: 33
End: 2023-08-30

## 2023-08-30 VITALS
BODY MASS INDEX: 26 KG/M2 | WEIGHT: 159.19 LBS | DIASTOLIC BLOOD PRESSURE: 72 MMHG | SYSTOLIC BLOOD PRESSURE: 109 MMHG | HEART RATE: 76 BPM

## 2023-08-30 DIAGNOSIS — E11.9 TYPE 2 DIABETES MELLITUS WITHOUT COMPLICATION, WITHOUT LONG-TERM CURRENT USE OF INSULIN (HCC): Primary | ICD-10-CM

## 2023-08-30 PROCEDURE — 3074F SYST BP LT 130 MM HG: CPT | Performed by: FAMILY MEDICINE

## 2023-08-30 PROCEDURE — 3078F DIAST BP <80 MM HG: CPT | Performed by: FAMILY MEDICINE

## 2023-08-30 PROCEDURE — 99214 OFFICE O/P EST MOD 30 MIN: CPT | Performed by: FAMILY MEDICINE

## 2023-08-30 PROCEDURE — 90471 IMMUNIZATION ADMIN: CPT | Performed by: FAMILY MEDICINE

## 2023-08-30 PROCEDURE — 90715 TDAP VACCINE 7 YRS/> IM: CPT | Performed by: FAMILY MEDICINE

## 2023-08-30 RX ORDER — DOCUSATE SODIUM 100 MG/1
100 CAPSULE, LIQUID FILLED ORAL 2 TIMES DAILY
Qty: 180 CAPSULE | Refills: 4 | Status: SHIPPED | OUTPATIENT
Start: 2023-08-30

## 2023-10-11 RX ORDER — LEVOTHYROXINE SODIUM 88 UG/1
88 TABLET ORAL
Qty: 90 TABLET | Refills: 0 | Status: CANCELLED | OUTPATIENT
Start: 2023-10-11

## 2023-10-12 ENCOUNTER — TELEPHONE (OUTPATIENT)
Dept: FAMILY MEDICINE CLINIC | Facility: CLINIC | Age: 33
End: 2023-10-12

## 2023-10-12 ENCOUNTER — PATIENT MESSAGE (OUTPATIENT)
Dept: FAMILY MEDICINE CLINIC | Facility: CLINIC | Age: 33
End: 2023-10-12

## 2023-10-12 RX ORDER — LEVOTHYROXINE SODIUM 88 UG/1
88 TABLET ORAL
Qty: 30 TABLET | Refills: 0 | Status: SHIPPED | OUTPATIENT
Start: 2023-10-12

## 2023-10-12 NOTE — TELEPHONE ENCOUNTER
RAMO [address] [place] [address] Kayce,     Please advise regarding rx refill for approval    LOV: 5/30/23  RTC: 3-4 months     Will call and schedule patient upon response

## 2023-10-12 NOTE — TELEPHONE ENCOUNTER
Patient calling regarding refills for Ozempic and Levothyroxine. She changed insurance and would like her prescriptions sent to [facility]. Advised her that those prescriptions were prescribed by Dr. Jarvis Wilder. Transferred patient to Dr. Vanessa Campbell office.

## 2023-10-12 NOTE — TELEPHONE ENCOUNTER
Pt is calling for a refill to be sent to [place]. She states that she has been out of medication for a few days.           levothyroxine 88 MCG Oral Tab, Take 1 tablet (88 mcg total) by mouth before breakfast., Disp: 90 tablet, Rfl: 0

## 2023-10-13 RX ORDER — SEMAGLUTIDE 1.34 MG/ML
1 INJECTION, SOLUTION SUBCUTANEOUS WEEKLY
Qty: 9 ML | Refills: 0 | Status: SHIPPED | OUTPATIENT
Start: 2023-10-13

## 2023-10-13 NOTE — TELEPHONE ENCOUNTER
LOV: 5/30/23    RTC: 3-4 Months    FU: No FU Appt Scheduled    Last Refill: 7/20/23    Called patient to help schedule a follow up appointment, no answer, lmtcb.  [facility] sent

## 2023-10-14 ENCOUNTER — TELEPHONE (OUTPATIENT)
Dept: ENDOCRINOLOGY CLINIC | Facility: CLINIC | Age: 33
End: 2023-10-14

## 2023-10-14 NOTE — TELEPHONE ENCOUNTER
From: Kell Davis  To: Jessica Whitlock  Sent: 10/12/2023 3:31 PM CDT  Subject: Blood Test Form Requst    Good afternoon,    During our last medical appointment, I was provided with forms to go in for a blood test. I have misplaced these documents. Can you please provide a copy of the required blood sample, so that I can go in for testing? Thank you!   Kell Davis  [phone]

## 2023-10-16 NOTE — TELEPHONE ENCOUNTER
Dr. Zuri Alberts, please see attachment and advise if form can be filled out based on last visit or of patient needs to come in for a physical to complete. Thanks. Last physical: 9/2022 ([employer] message sent to pt informing her she is overdue).     Future Appointments   Date Time Provider Khadijah Landin   2/13/2024  5:30 PM MOY [facility]

## 2023-10-16 NOTE — TELEPHONE ENCOUNTER
Future Appointments   Date Time Provider Khadijah Landin   2/13/2024  5:30 PM MO[facility]

## 2023-10-18 NOTE — TELEPHONE ENCOUNTER
Future Appointments   Date Time Provider Khadijah Landin   11/7/2023  5:40 [facility]   2/13/2024  5:30 [facility]   2/21/2024  5:45 PM [facility]

## 2023-11-08 RX ORDER — LEVOTHYROXINE SODIUM 88 UG/1
88 TABLET ORAL
Qty: 90 TABLET | Refills: 0 | Status: SHIPPED | OUTPATIENT
Start: 2023-11-08

## 2023-11-28 ENCOUNTER — PATIENT MESSAGE (OUTPATIENT)
Dept: FAMILY MEDICINE CLINIC | Facility: CLINIC | Age: 33
End: 2023-11-28

## 2023-11-30 NOTE — TELEPHONE ENCOUNTER
From: Renan Norris  To: Lidia Hernández  Sent: 11/28/2023 2:08 PM CST  Subject: Pain in lower right side of abdomen    Hi Dr Mati Jimenez,    I would like to schedule an appointment for a check up as I am having pain (dull like feeling) on the lower right side of my abdominal area. This feeling started yesterday and it is mild not severe. I would like to schedule an appointment if the pain is persistent and/or worsens. Please advise of any recommendations,     Thank you!   Renan Norris  #[phone]

## 2024-02-13 ENCOUNTER — OFFICE VISIT (OUTPATIENT)
Dept: ENDOCRINOLOGY CLINIC | Facility: CLINIC | Age: 34
End: 2024-02-13

## 2024-02-13 VITALS
HEIGHT: 66 IN | DIASTOLIC BLOOD PRESSURE: 78 MMHG | BODY MASS INDEX: 26.68 KG/M2 | HEART RATE: 71 BPM | WEIGHT: 166 LBS | SYSTOLIC BLOOD PRESSURE: 125 MMHG

## 2024-02-13 DIAGNOSIS — E03.9 ACQUIRED HYPOTHYROIDISM: ICD-10-CM

## 2024-02-13 DIAGNOSIS — E11.65 UNCONTROLLED TYPE 2 DIABETES MELLITUS WITH HYPERGLYCEMIA (HCC): Primary | ICD-10-CM

## 2024-02-13 DIAGNOSIS — E78.2 MIXED HYPERLIPIDEMIA: ICD-10-CM

## 2024-02-13 LAB
CARTRIDGE LOT#: ABNORMAL NUMERIC
GLUCOSE BLOOD: 143
HEMOGLOBIN A1C: 8.6 % (ref 4.3–5.6)
TEST STRIP LOT #: NORMAL NUMERIC

## 2024-02-13 PROCEDURE — 82947 ASSAY GLUCOSE BLOOD QUANT: CPT | Performed by: NURSE PRACTITIONER

## 2024-02-13 PROCEDURE — 3008F BODY MASS INDEX DOCD: CPT | Performed by: NURSE PRACTITIONER

## 2024-02-13 PROCEDURE — 3078F DIAST BP <80 MM HG: CPT | Performed by: NURSE PRACTITIONER

## 2024-02-13 PROCEDURE — 83036 HEMOGLOBIN GLYCOSYLATED A1C: CPT | Performed by: NURSE PRACTITIONER

## 2024-02-13 PROCEDURE — 99214 OFFICE O/P EST MOD 30 MIN: CPT | Performed by: NURSE PRACTITIONER

## 2024-02-13 PROCEDURE — 3074F SYST BP LT 130 MM HG: CPT | Performed by: NURSE PRACTITIONER

## 2024-02-13 PROCEDURE — 3052F HG A1C>EQUAL 8.0%<EQUAL 9.0%: CPT | Performed by: NURSE PRACTITIONER

## 2024-02-13 RX ORDER — METFORMIN HYDROCHLORIDE 500 MG/1
2000 TABLET, EXTENDED RELEASE ORAL DAILY
Qty: 360 TABLET | Refills: 0 | Status: SHIPPED | OUTPATIENT
Start: 2024-02-13 | End: 2024-05-13

## 2024-02-13 RX ORDER — TIRZEPATIDE 10 MG/.5ML
10 INJECTION, SOLUTION SUBCUTANEOUS WEEKLY
Qty: 6 ML | Refills: 0 | Status: SHIPPED | OUTPATIENT
Start: 2024-02-13

## 2024-02-13 NOTE — PATIENT INSTRUCTIONS
A1C: 8.6% today --> increased from 6.3% on 5/30/2023  Blood glucose: 143 in clinic today    Medications:   -start Metformin ER 2,000mg daily with biggest meal   -continue with Levemir 20 units nightly   -continue with Jardiance 25mg once daily in AM   -stop ozempic   -start Mounjaro 10mg once weekly       Omega 3- rich foods   Fish and other seafood (especially cold-water fatty fish, such as salmon, mackerel, tuna, herring, and sardines)  Nuts and seeds (such as flaxseed, suzanne seeds, and walnuts)  Plant oils (such as flaxseed oil, soybean oil, and canola oil)  Fortified foods (such as certain brands of eggs, yogurt, juices, milk, soy beverages,      Weight:  Wt Readings from Last 6 Encounters:   02/13/24 166 lb (75.3 kg)   08/30/23 159 lb 3.2 oz (72.2 kg)   05/30/23 160 lb (72.6 kg)   03/28/23 168 lb (76.2 kg)   01/31/23 166 lb (75.3 kg)   09/28/22 163 lb 3.2 oz (74 kg)     A1C goal:  <7.0%    Blood sugar testing:  Test your blood sugar 1 time daily   Recommended times to test: alternate with before breakfast (fasting) or 2hrs after meals     Blood sugar targets:  Before breakfast:  (preferably < 110)  Before meals OR 2 hours after meals: <180 (preferably <150)     Call for persistent blood sugars < 75 or > 200

## 2024-02-13 NOTE — PROGRESS NOTES
Name: Ursula Lorenzo  Date: 2/13/2024    CHIEF COMPLAINT   Chief Complaint   Patient presents with    Diabetes       HISTORY OF PRESENT ILLNESS   Ursula Lorenzo is a 33 year old female who presents for follow up on diabetes management.   HbA1C: 8.6% at POC today. This is an increase from 6.3% on 5/30/2023.   Blood glucose is: 143 in clinic today.     FAMILY HISTORY OF DIABETES  -mother -T2DM  -father - T2DM  -Maternal aunts and grandmother - T2DM  -paternal aunts - T2DM    DIABETES HISTORY  Diagnosed: around 2012- 2013 - no antibodies done at that time    Started Levemir around 4-5 years ago   ANGELO-65: <5 on 9/24/2022  Zinc transported 8 antibody: <10 on 9/24/2022    Prior HbA, C or glycohemoglobin were 9.6% 9/6/2022; 10.6% at POC today; 8.2% 3/28/2023; 6.3% 5/30/2023; 8.6% at POC today;     Patient has not had hospitalizations for blood sugar issues.    - hyperglycemia around 2 years ago   Denies any history of pancreatitis.     PREVIOUS MEDICATION FOR DM:  Glipizide- d/c'ed due to weight gain and other issues does not remember exactly at this time.     CURRENT MEDICATIONS FOR DM:  -Metformin 1,000mg twice daily - has been forgetting PM dose   -Levemir 20 units once nightly   -Jardiance 25mg once daily in AM   -Ozempic 1mg once weekly on wednesdays - tolerating well; denies GI s/e     HOME GLUCOSE READINGS:   Testing BG x 2 daily  Meter or log book today: no    Fasting: low 200s   Before dinner: around 280s   Hypoglycemia present: no   Hypoglycemia awareness: N/A -has never had an episode     HISTORY OF DIABETES COMPLICATIONS:  History of Retinopathy: no  - last eye exam within the last 12 months: yes -- last exam was around around 7/2022  History of Neuropathy: no   History of Nephropathy: no     ASSOCIATED COMPLICATIONS:   HTN: no   Hyperlipidemia: no   Cardiovascular Disease: no   Peripheral Vascular Disease: no     DIETARY COMPLIANCE:  Fair; has been working on following a low carb diet     EXERCISE:   No      Polyuria, polyphagia, polydipsia: yes   Paresthesias: no   Blurred vision: no   Recent steroids, illness or infections: no     REVIEW OF SYSTEMS  Constitutional: Negative for: weight change, fever, fatigue, cold/heat intolerance  Eyes: Negative for:  Visual changes, proptosis, blurring  ENT: Negative for:  dysphagia, neck swelling, dysphonia  Respiratory: Negative for: hemoptysis, shortness of breath, cough, or dyspnea.  Cardiovascular: Negative for:  chest pain, chest discomfort, palpitations  GI: Negative for:  abdominal pain, nausea, vomiting, diarrhea, heartburn, constipation  Neurology: Negative for: headache, dizziness, syncope, numbness/tingling, or weakness.   Genito-Urinary: Negative for: dysuria, frequency or hematuria   Hematology/Lymphatics: Negative for: bruising, easy bleeding, lower extremity edema  Skin: Negative for: rash, blister, infection or ulcers.  Endocrine: Negative for: polyuria, polydipsia. No osteoporosis.   Thyroid disease: yes - diagnosed around 2598-5837 ago. Currently maintained on Levothyroxine 88mcg once daily in AM.      MEDICATIONS:     Current Outpatient Medications:     levothyroxine 88 MCG Oral Tab, Take 1 tablet (88 mcg total) by mouth before breakfast., Disp: 90 tablet, Rfl: 0    semaglutide (OZEMPIC, 1 MG/DOSE,) 4 MG/3ML Subcutaneous Solution Pen-injector, Inject 1 mg into the skin once a week., Disp: 9 mL, Rfl: 0    docusate sodium 100 MG Oral Cap, Take 1 capsule (100 mg total) by mouth 2 (two) times daily., Disp: 180 capsule, Rfl: 4    metFORMIN HCl 1000 MG Oral Tab, Take 1 tablet (1,000 mg total) by mouth 2 (two) times daily with meals., Disp: 180 tablet, Rfl: 0    Empagliflozin (JARDIANCE) 25 MG Oral Tab, Take 1 tablet by mouth daily., Disp: 90 tablet, Rfl: 0    Blood Glucose Monitoring Suppl (ONETOUCH VERIO) w/Device Does not apply Kit, 1 each daily. Test 1x daily, Disp: 1 kit, Rfl: 0    Glucose Blood (ONETOUCH VERIO) In Vitro Strip, Test 1x daily, Disp: 100 strip,  Rfl: 1    OneTouch Delica Lancets 33G Does not apply Misc, Test 1x daily, Disp: 100 each, Rfl: 1    omeprazole 20 MG Oral Capsule Delayed Release, Take 1 capsule (20 mg total) by mouth every evening., Disp: 90 capsule, Rfl: 1    ondansetron (ZOFRAN ODT) 4 MG Oral Tablet Dispersible, Take 1 tablet (4 mg total) by mouth every 8 (eight) hours as needed for Nausea., Disp: 20 tablet, Rfl: 0    TRUEPLUS 5-BEVEL PEN NEEDLES 31G X 5 MM Does not apply Misc, Use to check blood sugar as directed TWICE DAILY, Disp: , Rfl:     ALLERGIES:   No Known Allergies    SOCIAL HISTORY:   Social History     Socioeconomic History    Marital status: Single   Tobacco Use    Smoking status: Never    Smokeless tobacco: Never   Vaping Use    Vaping Use: Some days    Substances: THC   Substance and Sexual Activity    Alcohol use: Yes    Drug use: Yes     Types: Cannabis       PAST MEDICAL HISTORY:   Past Medical History:   Diagnosis Date    Anemia     Diabetes (HCC)     approx 2012    Hypothyroidism        PAST SURGICAL HISTORY:   Past Surgical History:   Procedure Laterality Date    OTHER SURGICAL HISTORY      LT wrist fx       PHYSICAL EXAM:   Vitals:    02/13/24 1740   BP: 125/78   Pulse: 71   Weight: 166 lb (75.3 kg)   Height: 5' 6\" (1.676 m)       BMI:   Body mass index is 26.79 kg/m².      General Appearance:  alert, well developed, in no acute distress  Nutritional:  no extreme weight gain or loss  Head: Atraumatic  Eyes:  normal conjunctivae, sclera., normal sclera and normal pupils  Throat/Neck: normal sound to voice. Normal hearing, normal speech  Back: no kyphosis  Respiratory:  Speaking in full sentences, non-labored. no increased work of breathing, no audible wheezing    Skin:  normal moisture and skin texture, no visible lesions  Hair and nails: normal scalp hair  Hematologic:  no excessive bruising  Neuro: motor grossly intact, moving all extremities without difficulty  Psychiatric:  oriented to time, self, and place  Extremities:  no obvious extremity swelling, no lesions    Diabetic Foot Exam:   Bilateral barefoot skin diabetic exam is normal, visualized feet and the appearance is normal.  Bilateral monofilament/sensation of both feet is normal.  Pulsation pedal pulse exam of both lower legs/feet is normal as well.    LABS: Pertinent labs reviewed    ASSESSMENT/PLAN:    -Reviewed with patient the pathogenesis of diabetes, clinical significance of A1c, and common complications such as: microvascular, macrovascular and diabetic ketoacidosis. Patient verbalizes understanding of the importance of glycemic control and the goals of therapy.   -Discussed with patient glucose targets ranges (Fasting  and post prandial <180).     1.Type 2 Diabetes Mellitus, uncontrolled, with hyperglycemia   -LAB DATA  HbA,C: 8.6% today   a) Medications   -start Metformin ER 2,000mg daily with biggest meal   -continue with Levemir 20 units nightly   -continue with Jardiance 25mg once daily in AM   -stop ozempic  - start Mounjaro 10mg once weekly - Risks and benefits reviewed. Verbalizes understanding.    -reviewed Mounjaro administration technique in clinic today   -discussed getting back on track with following a low carb diet   - discussed starting to exercise 5x weekly   -Discussed importance of hydration. Patient advised to drink 1-2 glasses additional of water than usually to avoid dehydration while on Jardiance.   -reviewed target goal BG readings and A1C  -reviewed when to call and notify me of abnormal BG readings.     b) No nephropathy: 108 on 2022 and urine MA: 4 on 22 --> repeat labs   c) UTD with optho   D) foot exam: normal today 2024  e) cont. Testing BG readings 1x daily -alternate testing times with fasting or 2hrs after meals   f) Life style changes reviewed.     2. Blood Pressure Management   -normotensive today     3.hyperlipidemia   -LDL: 112 and tri on 10/27/2023  -reviewed ADA guidelines of LDL goal <70  - discussed to  incorporate foods high in omega 3s to help with triglycerides   - reviewed benefits of statin therapy. Patient has agreed to have lipid panel repeated in order to confirm new dose for statin therapy.   - lipid panel order entered today      4. Hypothyroidism  -reviewed nonspecific symptoms of thyroid disease  -Reviewed labs and TSH level is at goal on 3/16/2023  -on Levothyroxine 88mcg once daily   -repeat labs       RTC in 3 months   Patient instructed to call sooner if they develop Blood glucose readings <75 and/or if they have readings persistently >200.     The risks and benefits of my recommendations were discussed with the patient today. questions were also answered to the best of my knowledge. Patient verbalizes understanding of these issues and agrees to the plan.    2/13/2024  MOY Elizalde

## 2024-02-15 ENCOUNTER — TELEPHONE (OUTPATIENT)
Dept: ENDOCRINOLOGY CLINIC | Facility: CLINIC | Age: 34
End: 2024-02-15

## 2024-02-19 NOTE — TELEPHONE ENCOUNTER
LOV: 2/13/24    RTC:  3 Months    FU: 5/28/24    Last Refill: 11/8/23    3 Month Supply Pending     Called and spoke to patient, appointment booked with APRN on 5/28/24.

## 2024-02-20 RX ORDER — LEVOTHYROXINE SODIUM 88 UG/1
88 TABLET ORAL
Qty: 90 TABLET | Refills: 0 | Status: SHIPPED | OUTPATIENT
Start: 2024-02-20

## 2024-02-20 NOTE — TELEPHONE ENCOUNTER
Received fax from [employer] in regards to Mounjaro 10MG/0.5ML. Medication has been approved from 1/20/24 to 2/19/25. DocOnYout message sent to pt and approval letter sent to scanning.  Approval # - [id number]

## 2024-03-10 LAB
ALBUMIN/GLOBULIN RATIO: 1.7 (CALC) (ref 1–2.5)
ALBUMIN: 4.4 G/DL (ref 3.6–5.1)
ALKALINE PHOSPHATASE: 91 U/L (ref 31–125)
ALT: 21 U/L (ref 6–29)
AST: 14 U/L (ref 10–30)
BILIRUBIN, TOTAL: 0.3 MG/DL (ref 0.2–1.2)
BUN: 15 MG/DL (ref 7–25)
CALCIUM: 9.5 MG/DL (ref 8.6–10.2)
CARBON DIOXIDE: 22 MMOL/L (ref 20–32)
CHLORIDE: 106 MMOL/L (ref 98–110)
CHOL/HDLC RATIO: 3.6 (CALC)
CHOLESTEROL, TOTAL: 190 MG/DL
CREATININE: 0.79 MG/DL (ref 0.5–0.97)
EGFR: 101 ML/MIN/1.73M2
GLOBULIN: 2.6 G/DL (CALC) (ref 1.9–3.7)
GLUCOSE: 285 MG/DL (ref 65–99)
HDL CHOLESTEROL: 53 MG/DL
LDL-CHOLESTEROL: 109 MG/DL (CALC)
NON-HDL CHOLESTEROL: 137 MG/DL (CALC)
POTASSIUM: 4.2 MMOL/L (ref 3.5–5.3)
PROTEIN, TOTAL: 7 G/DL (ref 6.1–8.1)
SODIUM: 136 MMOL/L (ref 135–146)
T4, FREE: 1.3 NG/DL (ref 0.8–1.8)
TRIGLYCERIDES: 164 MG/DL
TSH: 2.86 MIU/L
VITAMIN B12: 604 PG/ML (ref 200–1100)
VITAMIN D, 25-OH, TOTAL: 24 NG/ML (ref 30–100)

## 2024-03-15 ENCOUNTER — TELEPHONE (OUTPATIENT)
Dept: ENDOCRINOLOGY CLINIC | Facility: CLINIC | Age: 34
End: 2024-03-15

## 2024-03-15 NOTE — TELEPHONE ENCOUNTER
Message to Prescriber:    Message: Medication on backorder and no promise date.  Please prescribe alternative -Thank you 3/7

## 2024-04-01 ENCOUNTER — TELEPHONE (OUTPATIENT)
Dept: FAMILY MEDICINE CLINIC | Facility: CLINIC | Age: 34
End: 2024-04-01

## 2024-04-24 RX ORDER — EMPAGLIFLOZIN 25 MG/1
1 TABLET, FILM COATED ORAL DAILY
Qty: 90 TABLET | Refills: 1 | Status: SHIPPED | OUTPATIENT
Start: 2024-04-24

## 2024-04-24 NOTE — TELEPHONE ENCOUNTER
Endocrine Refill protocol for oral and injectable diabetic medications    Protocol Criteria:    -Appointment with Endocrinology completed in the last 6 months or scheduled in the next 3 months    -A1c result <8.5% in the past 6 months      Verify the above has been completed or scheduled in the appropriate timeline. If so can send a 90 day supply with 1 refill.     Last completed office visit: 02/13/24  Next scheduled Follow up: 07/30/24  Last A1c result: 8.6

## 2024-05-21 RX ORDER — METFORMIN HYDROCHLORIDE 500 MG/1
2000 TABLET, EXTENDED RELEASE ORAL DAILY
Qty: 360 TABLET | Refills: 0 | Status: SHIPPED | OUTPATIENT
Start: 2024-05-21

## 2024-05-21 NOTE — TELEPHONE ENCOUNTER
Endocrine Refill protocol for metformin    Protocol Criteria:    -Appointment with Endocrinology completed in the last 6 months or scheduled in the next 3 months    -GFR greater than or equal to 40 in the past 12 months     -A1c result <8.5% in the past 6 months      Verify the above has been completed or scheduled in the appropriate timeline. If so can send a 90 day supply with 1 refill.       Last completed office visit:2/13/2024  Next scheduled Follow up: 07/30/24      Last GFR result:   101    Last A1c result:8.6

## 2024-06-03 ENCOUNTER — PATIENT MESSAGE (OUTPATIENT)
Dept: ENDOCRINOLOGY CLINIC | Facility: CLINIC | Age: 34
End: 2024-06-03

## 2024-06-03 RX ORDER — SEMAGLUTIDE 2.68 MG/ML
2 INJECTION, SOLUTION SUBCUTANEOUS WEEKLY
Qty: 9 ML | Refills: 0 | Status: SHIPPED | OUTPATIENT
Start: 2024-06-03

## 2024-06-03 NOTE — TELEPHONE ENCOUNTER
Endocrine Refill protocol for oral and injectable diabetic medications    Protocol Criteria:    -Appointment with Endocrinology completed in the last 6 months or scheduled in the next 3 months    -A1c result <8.5% in the past 6 months      Verify the above has been completed or scheduled in the appropriate timeline. If so can send a 90 day supply with 1 refill.     Last completed office visit: 2/13/2024   Next scheduled Follow up: 07/30/24     Last A1c result: Last A1c value was 8.6% done 2/13/2024.

## 2024-06-04 NOTE — TELEPHONE ENCOUNTER
From: Ursula Lorenzo  To: Khadra Abilioserafin  Sent: 6/3/2024 9:46 AM CDT  Subject: Refill on Ozempic 2mg    Hi Marjuan carlosmary,    I need a refill on my medication for Ozempic 2mg. I noticed that the Mounjaro has been out of stock. Please provide a refill for Ozempic at the [facility] located in [place].     Thank you,  Ursula Lorenzo

## 2024-07-30 ENCOUNTER — OFFICE VISIT (OUTPATIENT)
Dept: ENDOCRINOLOGY CLINIC | Facility: CLINIC | Age: 34
End: 2024-07-30

## 2024-07-30 VITALS
SYSTOLIC BLOOD PRESSURE: 118 MMHG | HEIGHT: 67 IN | DIASTOLIC BLOOD PRESSURE: 75 MMHG | HEART RATE: 70 BPM | BODY MASS INDEX: 25.9 KG/M2 | WEIGHT: 165 LBS

## 2024-07-30 DIAGNOSIS — E11.65 UNCONTROLLED TYPE 2 DIABETES MELLITUS WITH HYPERGLYCEMIA (HCC): Primary | ICD-10-CM

## 2024-07-30 DIAGNOSIS — E03.9 ACQUIRED HYPOTHYROIDISM: ICD-10-CM

## 2024-07-30 DIAGNOSIS — E78.2 MIXED HYPERLIPIDEMIA: ICD-10-CM

## 2024-07-30 LAB
GLUCOSE BLOOD: 130
HEMOGLOBIN A1C: 8.1 % (ref 4.3–5.6)
TEST STRIP LOT #: NORMAL NUMERIC

## 2024-07-30 PROCEDURE — 3074F SYST BP LT 130 MM HG: CPT | Performed by: NURSE PRACTITIONER

## 2024-07-30 PROCEDURE — 99214 OFFICE O/P EST MOD 30 MIN: CPT | Performed by: NURSE PRACTITIONER

## 2024-07-30 PROCEDURE — 3052F HG A1C>EQUAL 8.0%<EQUAL 9.0%: CPT | Performed by: NURSE PRACTITIONER

## 2024-07-30 PROCEDURE — 3078F DIAST BP <80 MM HG: CPT | Performed by: NURSE PRACTITIONER

## 2024-07-30 PROCEDURE — 83036 HEMOGLOBIN GLYCOSYLATED A1C: CPT | Performed by: NURSE PRACTITIONER

## 2024-07-30 PROCEDURE — 82947 ASSAY GLUCOSE BLOOD QUANT: CPT | Performed by: NURSE PRACTITIONER

## 2024-07-30 PROCEDURE — 3008F BODY MASS INDEX DOCD: CPT | Performed by: NURSE PRACTITIONER

## 2024-07-30 RX ORDER — ACYCLOVIR 400 MG/1
1 TABLET ORAL
Qty: 9 EACH | Refills: 1 | Status: SHIPPED | OUTPATIENT
Start: 2024-07-30

## 2024-07-30 RX ORDER — PEN NEEDLE, DIABETIC 31 GX3/16"
NEEDLE, DISPOSABLE MISCELLANEOUS
Qty: 100 EACH | Refills: 1 | Status: SHIPPED | OUTPATIENT
Start: 2024-07-30

## 2024-07-30 RX ORDER — INSULIN ASPART 100 [IU]/ML
4 INJECTION, SOLUTION INTRAVENOUS; SUBCUTANEOUS
Qty: 6 ML | Refills: 0 | Status: SHIPPED | OUTPATIENT
Start: 2024-07-30

## 2024-07-30 RX ORDER — TIRZEPATIDE 12.5 MG/.5ML
12.5 INJECTION, SOLUTION SUBCUTANEOUS WEEKLY
Qty: 6 ML | Refills: 0 | Status: SHIPPED | OUTPATIENT
Start: 2024-08-06

## 2024-07-30 NOTE — PROGRESS NOTES
Sample continuous glucose monitor,Dexcom G7, placed on patient's Right arm. Patient understands guidelines and usage of sensor. Patient's sensor connected to clinic and aware of follow up.

## 2024-07-30 NOTE — PROGRESS NOTES
Name: Ursula Lorenzo  Date: 7/30/2024    CHIEF COMPLAINT   Chief Complaint   Patient presents with    Diabetes     Pt is here for follow up for diabetes and A1c check       HISTORY OF PRESENT ILLNESS   Ursula Lorenzo is a 33 year old female who presents for follow up on diabetes management.   HbA1C: 8.1% at POC today. Previously was 8.6% on 2/13/2024.   Blood glucose is: 130 in clinic today.     FAMILY HISTORY OF DIABETES  -mother -T2DM  -father - T2DM  -Maternal aunts and grandmother - T2DM  -paternal aunts - T2DM    DIABETES HISTORY  Diagnosed: around 2012- 2013 - no antibodies done at that time    Started Levemir around 4-5 years ago   ANGELO-65: <5 on 9/24/2022  Zinc transported 8 antibody: <10 on 9/24/2022    Prior HbA, C or glycohemoglobin were 9.6% 9/6/2022; 10.6% at POC today; 8.2% 3/28/2023; 6.3% 5/30/2023; 8.6% 2/13/2024; 8.1% at POC today;     Patient has not had hospitalizations for blood sugar issues.    - hyperglycemia around 2 years ago   Denies any history of pancreatitis.     PREVIOUS MEDICATION FOR DM:  Glipizide- d/c'ed due to weight gain and other issues does not remember exactly at this time.     CURRENT MEDICATIONS FOR DM:  -Metformin 1,000mg twice daily  -Levemir 20 units once nightly  - has not taken for the past 2 months now   -Jardiance 25mg once daily in AM   - Mounjaro 10mg once weekly - has had 2 doses so far; has mild constipation that is tolerable; otherwise denies any other GI s/e     HOME GLUCOSE READINGS:   Testing BG x 2 daily  Meter or log book today: no    Fasting: 160s   Before lunch: 150s   Before dinner: 270s -280s   Hypoglycemia present: no   Hypoglycemia awareness: N/A -has never had an episode     HISTORY OF DIABETES COMPLICATIONS:  History of Retinopathy: no  - last eye exam within the last 12 months: yes -- followed by [facility] in [place]   History of Neuropathy: no   History of Nephropathy: no     ASSOCIATED COMPLICATIONS:   HTN: no   Hyperlipidemia: no    Cardiovascular Disease: no   Peripheral Vascular Disease: no     DIETARY COMPLIANCE:  Fair; has been working on following a low carb diet   Breakfast: Yogurt and bagel (thin) and starburcks coffee   Lunch: subway 6'' sandwich with cookie; coke zero   Dinner: pizza or salad or chicken or pasta; rice   Snacks: cheetos chips     EXERCISE:   Yes - walking on the weekends 14-15,000 steps per day     Polyuria, polyphagia, polydipsia: yes   Paresthesias: no   Blurred vision: no   Recent steroids, illness or infections: no     REVIEW OF SYSTEMS  Constitutional: Negative for: weight change, fever, fatigue, cold/heat intolerance  Eyes: Negative for:  Visual changes, proptosis, blurring  ENT: Negative for:  dysphagia, neck swelling, dysphonia  Respiratory: Negative for: hemoptysis, shortness of breath, cough, or dyspnea.  Cardiovascular: Negative for:  chest pain, chest discomfort, palpitations  GI: Negative for:  abdominal pain, nausea, vomiting, diarrhea, heartburn, constipation  Neurology: Negative for: headache, dizziness, syncope, numbness/tingling, or weakness.   Genito-Urinary: Negative for: dysuria, frequency or hematuria   Hematology/Lymphatics: Negative for: bruising, easy bleeding, lower extremity edema  Skin: Negative for: rash, blister, infection or ulcers.  Endocrine: Negative for: polyuria, polydipsia. No osteoporosis.   Thyroid disease: yes - diagnosed around 8986-9996 ago. Currently maintained on Levothyroxine 88mcg once daily in AM.      MEDICATIONS:     Current Outpatient Medications:     OZEMPIC, 2 MG/DOSE, 8 MG/3ML Subcutaneous Solution Pen-injector, INJECT 2MG INTO THE SKIN ONCE A WEEK, Disp: 9 mL, Rfl: 0    METFORMIN  MG Oral Tablet 24 Hr, TAKE 4 TABLETS(2000 MG) BY MOUTH DAILY, Disp: 360 tablet, Rfl: 0    Empagliflozin (JARDIANCE) 25 MG Oral Tab, Take 1 tablet by mouth daily., Disp: 90 tablet, Rfl: 1    levothyroxine 88 MCG Oral Tab, Take 1 tablet (88 mcg total) by mouth before breakfast., Disp:  90 tablet, Rfl: 0    Tirzepatide (MOUNJARO) 10 MG/0.5ML Subcutaneous Solution Pen-injector, Inject 10 mg into the skin once a week., Disp: 6 mL, Rfl: 0    docusate sodium 100 MG Oral Cap, Take 1 capsule (100 mg total) by mouth 2 (two) times daily., Disp: 180 capsule, Rfl: 4    Blood Glucose Monitoring Suppl (ONETOUCH VERIO) w/Device Does not apply Kit, 1 each daily. Test 1x daily, Disp: 1 kit, Rfl: 0    Glucose Blood (ONETOUCH VERIO) In Vitro Strip, Test 1x daily, Disp: 100 strip, Rfl: 1    OneTouch Delica Lancets 33G Does not apply Misc, Test 1x daily, Disp: 100 each, Rfl: 1    omeprazole 20 MG Oral Capsule Delayed Release, Take 1 capsule (20 mg total) by mouth every evening., Disp: 90 capsule, Rfl: 1    ondansetron (ZOFRAN ODT) 4 MG Oral Tablet Dispersible, Take 1 tablet (4 mg total) by mouth every 8 (eight) hours as needed for Nausea., Disp: 20 tablet, Rfl: 0    TRUEPLUS 5-BEVEL PEN NEEDLES 31G X 5 MM Does not apply Misc, Use to check blood sugar as directed TWICE DAILY, Disp: , Rfl:     ALLERGIES:   No Known Allergies    SOCIAL HISTORY:   Social History     Socioeconomic History    Marital status:    Tobacco Use    Smoking status: Never    Smokeless tobacco: Never   Vaping Use    Vaping status: Some Days    Substances: THC   Substance and Sexual Activity    Alcohol use: Yes    Drug use: Yes     Types: Cannabis       PAST MEDICAL HISTORY:   Past Medical History:    Anemia    Diabetes (HCC)    approx 2012    Hypothyroidism       PAST SURGICAL HISTORY:   Past Surgical History:   Procedure Laterality Date    Other surgical history      LT wrist fx       PHYSICAL EXAM:   Vitals:    07/30/24 1734   BP: 118/75   Pulse: 70   Weight: 165 lb (74.8 kg)   Height: 5' 7\" (1.702 m)       BMI:   Body mass index is 25.84 kg/m².    General Appearance:  alert, well developed, in no acute distress  Nutritional:  no extreme weight gain or loss  Head: Atraumatic  Eyes:  normal conjunctivae, sclera., normal sclera and normal  pupils  Throat/Neck: normal sound to voice. Normal hearing, normal speech  Back: no kyphosis  Respiratory:  Speaking in full sentences, non-labored. no increased work of breathing, no audible wheezing    Skin:  normal moisture and skin texture, no visible lesions  Hair and nails: normal scalp hair  Hematologic:  no excessive bruising  Neuro: motor grossly intact, moving all extremities without difficulty  Psychiatric:  oriented to time, self, and place  Extremities: no obvious extremity swelling, no lesions    LABS: Pertinent labs reviewed    ASSESSMENT/PLAN:    -Reviewed with patient the pathogenesis of diabetes, clinical significance of A1c, and common complications such as: microvascular, macrovascular and diabetic ketoacidosis. Patient verbalizes understanding of the importance of glycemic control and the goals of therapy.   -Discussed with patient glucose targets ranges (Fasting  and post prandial <180).     1.Type 2 Diabetes Mellitus, uncontrolled, with hyperglycemia   -LAB DATA  HbA,C: 8.1% today   a) Medications   - Continue with metformin ER 500mg twice daily  -continue with Jardiance 25mg once daily in the morning  -Continue with Mounjaro 10mg once weekly for another 2 weeks, then increase Mounjaro to 12.5 mg once weekly - Risks and benefits reviewed. Verbalizes understanding.  -Start taking a daily probiotic  -Start NovoLog 4 units with dinner - Risks and benefits reviewed. Verbalizes understanding.    -Discussed that she gives me an update on her blood glucose readings in 1 to 2 weeks  -Reviewed benefits of CGM technology.  Patient is interested in getting started with Dexcom G7.  Patient has been educated on how to use Dexcom CGM and how to apply sensor today.  Sample sensor was started in clinic today.  -discussed getting back on track with following a low carb diet   -Discussed to work on eliminating late night snack  -Continue to stay active-goal is at least 150 minutes/week  -Discussed  importance of hydration. Patient advised to drink 1-2 glasses additional of water than usually to avoid dehydration while on Jardiance.   -reviewed target goal BG readings and A1C  -reviewed when to call and notify me of abnormal BG readings.     b) No nephropathy: 101 on 3/9/2024 and urine MA: 4 on 22 --> reminded to repeat urine MA lab  c) UTD with optho - followed by [facility] in [place]; will work on obtaining eye exam report  D) foot exam: normal on 2024  e) start using Dexcom G7 CGM.  A sample was started in clinic today.  f) Life style changes reviewed.     2. Blood Pressure Management   -normotensive today     3.hyperlipidemia   -LDL: 112 and tri on 10/27/2023  -reviewed ADA guidelines of LDL goal <70  -Will repeat lipid panel at next follow-up visit      4. Hypothyroidism  -reviewed nonspecific symptoms of thyroid disease  -Reviewed labs and TSH level is at goal on 3/9/2024  -Continue with levothyroxine 88mcg once daily       RTC in 3 months   Patient instructed to call sooner if they develop Blood glucose readings <75 and/or if they have readings persistently >200.     The risks and benefits of my recommendations were discussed with the patient today. questions were also answered to the best of my knowledge. Patient verbalizes understanding of these issues and agrees to the plan.    2024  MOY Elizalde

## 2024-07-30 NOTE — PATIENT INSTRUCTIONS
A1C: 8.1% today --> previously was 8.6% on 2/13/2024  Blood glucose: 130 in clinic today    Medications:   - continue with Metformin ER 500mg twice daily   - continue with Jardiance 25mg once daily in the morning   - continue with Mounjaro 10mg once weekly   - start taking Probiotic daily in the morning   - start Novolog 4 units with dinner     Please give me an update on your blood glucose readings in 1-2 weeks      Weight:  Wt Readings from Last 6 Encounters:   07/30/24 165 lb (74.8 kg)   02/13/24 166 lb (75.3 kg)   08/30/23 159 lb 3.2 oz (72.2 kg)   05/30/23 160 lb (72.6 kg)   03/28/23 168 lb (76.2 kg)   01/31/23 166 lb (75.3 kg)     A1C goal:  <7.0% (preferably <6.5%)    Blood sugar testing:  Start using Dexcom G7 continuous glucometer     Blood sugar targets:  Before breakfast:  (preferably < 110)  2 hours after meals: <150     Call for persistent blood sugars < 75 or > 200

## 2024-10-22 RX ORDER — LEVOTHYROXINE SODIUM 88 UG/1
88 TABLET ORAL
Qty: 90 TABLET | Refills: 0 | Status: SHIPPED | OUTPATIENT
Start: 2024-10-22

## 2024-10-22 NOTE — TELEPHONE ENCOUNTER
Endocrine refill protocol for medications for hypothyroidism and hyperthyroidism    Protocol Criteria:  PASSED Reason: N/A    If all below requirements are met, send a 90-day supply with 1 refill per provider protocol.    Verify appointment with Endocrinology completed in the last 12 months or scheduled in the next 6 months.    Normal TSH result in the past 12 months   Review recent telephone encounters and mychart communications with patient to ensure a dose change has not occurred since last office visit that was not updated in the medication history list     Last completed office visit:7/30/2024 Khadra Quinn APRN   Next scheduled Follow up:   Future Appointments   Date Time Provider Department Center   12/2/2024  1:15 PM Janine Morales MD [facility]      Last TSH result:   TSH   Date Value Ref Range Status   03/09/2024 2.86 mIU/L Final     Comment:               Reference Range                         > or = 20 Years  0.40-4.50                              Pregnancy Ranges            First trimester    0.26-2.66            Second trimester   0.55-2.73            Third trimester    0.43-2.91

## 2024-11-06 ENCOUNTER — PATIENT MESSAGE (OUTPATIENT)
Dept: ENDOCRINOLOGY CLINIC | Facility: CLINIC | Age: 34
End: 2024-11-06

## 2024-11-11 RX ORDER — ROSUVASTATIN CALCIUM 20 MG/1
20 TABLET, COATED ORAL NIGHTLY
Qty: 90 TABLET | Refills: 0 | Status: SHIPPED | OUTPATIENT
Start: 2024-11-11

## 2024-11-25 ENCOUNTER — PATIENT MESSAGE (OUTPATIENT)
Dept: ENDOCRINOLOGY CLINIC | Facility: CLINIC | Age: 34
End: 2024-11-25

## 2024-11-25 DIAGNOSIS — E11.65 UNCONTROLLED TYPE 2 DIABETES MELLITUS WITH HYPERGLYCEMIA (HCC): Primary | ICD-10-CM

## 2024-11-26 RX ORDER — TIRZEPATIDE 12.5 MG/.5ML
12.5 INJECTION, SOLUTION SUBCUTANEOUS WEEKLY
Qty: 6 ML | Refills: 1 | Status: SHIPPED | OUTPATIENT
Start: 2024-11-26

## 2024-11-26 NOTE — TELEPHONE ENCOUNTER
Endocrine Refill protocol for oral and injectable diabetic medications    Protocol Criteria:  PASSED  Reason: N/A    If all below requirements are met, send a 90-day supply with 1 refill per provider protocol.    Verify appointment with Endocrinology completed in the last 6 months or scheduled in the next 3 months.  Verify A1C has been completed within the last 6 months and is below 8.5%     Last completed office visit: 7/30/2024 Khadra Quinn APRN   Next scheduled Follow up: No future appointments. [facility] sent  Last A1c result: Last A1c value was 8.1% done 7/30/2024.

## 2024-11-26 NOTE — TELEPHONE ENCOUNTER
Patient scheduled appointment for 2/12/25.    Endocrine Refill protocol for oral and injectable diabetic medications    Protocol Criteria:  PASSED  Reason: N/A    If all below requirements are met, send a 90-day supply with 1 refill per provider protocol.    Verify appointment with Endocrinology completed in the last 6 months or scheduled in the next 3 months.  Verify A1C has been completed within the last 6 months and is below 8.5%     Last completed office visit: 7/30/2024 Khadra Quinn APRN   Next scheduled Follow up:   Future Appointments   Date Time Provider Department Center   2/12/2025  1:45 PM Khadra Quinn APRN [facility]      Last A1c result: Last A1c value was 8.1% done 7/30/2024.  Per care everywhere, A1C completed on 10/21/24 is 6.7

## 2024-11-26 NOTE — TELEPHONE ENCOUNTER
Endocrine Refill protocol for oral and injectable diabetic medications    Protocol Criteria:  PASSED  Reason: N/A    If all below requirements are met, send a 90-day supply with 1 refill per provider protocol.    Verify appointment with Endocrinology completed in the last 6 months or scheduled in the next 3 months.  Verify A1C has been completed within the last 6 months and is below 8.5%     Last completed office visit: 7/30/2024 Khadra Quinn APRN   Next scheduled Follow up:   Future Appointments   Date Time Provider Department Center   2/12/2025  1:45 PM Khadra Quinn APRN [facility]      Last A1c result: Last A1c value was 8.1% done 7/30/2024.  Per care everywhere, A1C completed on 10/21/24 is 6.7    Medication refilled in a separate encounter. Patient notified of refill via [employer].

## 2025-01-07 RX ORDER — EMPAGLIFLOZIN 25 MG/1
TABLET, FILM COATED ORAL DAILY
Qty: 90 TABLET | Refills: 1 | Status: SHIPPED | OUTPATIENT
Start: 2025-01-07

## 2025-01-07 NOTE — TELEPHONE ENCOUNTER
Endocrine Refill protocol for oral and injectable diabetic medications    Protocol Criteria:  PASSED  Reason: N/A    If all below requirements are met, send a 90-day supply with 1 refill per provider protocol.    Verify appointment with Endocrinology completed in the last 6 months or scheduled in the next 3 months.  Verify A1C has been completed within the last 6 months and is below 8.5%     Last completed office visit: 7/30/2024 Khadra Quinn APRN   Next scheduled Follow up:   Future Appointments   Date Time Provider Department Center   2/12/2025  1:45 PM Khadra Quinn APRN [facility]      Last A1c result: Last A1c value was 8.1% done 7/30/2024.

## 2025-02-20 RX ORDER — LEVOTHYROXINE SODIUM 88 UG/1
88 TABLET ORAL
Qty: 90 TABLET | Refills: 0 | Status: SHIPPED | OUTPATIENT
Start: 2025-02-20

## 2025-03-03 DIAGNOSIS — E11.65 UNCONTROLLED TYPE 2 DIABETES MELLITUS WITH HYPERGLYCEMIA (HCC): ICD-10-CM

## 2025-03-03 RX ORDER — TIRZEPATIDE 10 MG/.5ML
10 INJECTION, SOLUTION SUBCUTANEOUS WEEKLY
Qty: 6 ML | Refills: 0 | Status: CANCELLED | OUTPATIENT
Start: 2025-03-03

## 2025-03-03 RX ORDER — TIRZEPATIDE 10 MG/.5ML
10 INJECTION, SOLUTION SUBCUTANEOUS
COMMUNITY
End: 2025-03-03

## 2025-03-03 RX ORDER — TIRZEPATIDE 12.5 MG/.5ML
12.5 INJECTION, SOLUTION SUBCUTANEOUS WEEKLY
Qty: 6 ML | Refills: 1 | Status: SHIPPED | OUTPATIENT
Start: 2025-03-03

## 2025-03-03 RX ORDER — ACYCLOVIR 400 MG/1
TABLET ORAL
Qty: 9 EACH | Refills: 1 | Status: SHIPPED | OUTPATIENT
Start: 2025-03-03

## 2025-03-03 NOTE — TELEPHONE ENCOUNTER
Endocrine Refill protocol for oral and injectable diabetic medications    Protocol Criteria:  PASSED  Reason: N/A    If all below requirements are met, send a 90-day supply with 1 refill per provider protocol.    Verify appointment with Endocrinology completed in the last 6 months or scheduled in the next 3 months.  Verify A1C has been completed within the last 6 months and is below 8.5%     Last completed office visit: 7/30/2024 Khadra Quinn APRN   Next scheduled Follow up:   Future Appointments   Date Time Provider Department Center   5/19/2025  3:45 PM Khadra Quinn APRN [facility]             Last A1c result: Last A1c value was 8.1% done 7/30/2024.

## 2025-03-03 NOTE — TELEPHONE ENCOUNTER
Medication Quantity Refills Start End   Tirzepatide (MOUNJARO) 10 MG/0.5ML Subcutaneous Solution Pen-injector 6 mL 0 2/13/2024 --   Sig:   Inject 10 mg into the skin once a week.     Route:   Subcutaneous     Order #:   [id number]

## 2025-03-03 NOTE — TELEPHONE ENCOUNTER
Endocrine Refill protocol for CGM supplies     Protocol Criteria:  PASSED Reason: N/A    If below requirement is met, send a 90-day supply with 1 refill per provider protocol.     Verify appointment with Endocrinology completed in the last 12 months or scheduled in the next 6 months     Last completed office visit:7/30/2024 Khadra Quinn APRN   Next scheduled Follow up:   Future Appointments   Date Time Provider Department Center   5/19/2025  3:45 PM Khadra Quinn APRN [facility]

## 2025-03-23 ENCOUNTER — TELEPHONE (OUTPATIENT)
Dept: FAMILY MEDICINE CLINIC | Facility: CLINIC | Age: 35
End: 2025-03-23

## 2025-03-23 NOTE — TELEPHONE ENCOUNTER
Answering a paged, patient called complaining of hive. Reports generalized rash associated with itchiness. She took Claritin-D. Denied having nasal congestion or SOB/wheezing. Possible causes include eating certain foods yesterday. Advised to stop Claritin-D. Take Zyrtec 1 tab once a day prn and may add Benadryl 25 mg Q 6 hrs prn. Make appointment with her PCP for allergy testings.

## 2025-03-24 ENCOUNTER — PATIENT MESSAGE (OUTPATIENT)
Dept: FAMILY MEDICINE CLINIC | Facility: CLINIC | Age: 35
End: 2025-03-24

## 2025-03-24 ENCOUNTER — TELEPHONE (OUTPATIENT)
Dept: FAMILY MEDICINE CLINIC | Facility: CLINIC | Age: 35
End: 2025-03-24

## 2025-03-24 ENCOUNTER — LAB ENCOUNTER (OUTPATIENT)
Dept: LAB | Age: 35
End: 2025-03-24
Attending: FAMILY MEDICINE
Payer: COMMERCIAL

## 2025-03-24 ENCOUNTER — NURSE TRIAGE (OUTPATIENT)
Dept: FAMILY MEDICINE CLINIC | Facility: CLINIC | Age: 35
End: 2025-03-24

## 2025-03-24 ENCOUNTER — OFFICE VISIT (OUTPATIENT)
Dept: FAMILY MEDICINE CLINIC | Facility: CLINIC | Age: 35
End: 2025-03-24
Payer: COMMERCIAL

## 2025-03-24 VITALS
DIASTOLIC BLOOD PRESSURE: 83 MMHG | WEIGHT: 155 LBS | HEART RATE: 75 BPM | SYSTOLIC BLOOD PRESSURE: 135 MMHG | HEIGHT: 67 IN | TEMPERATURE: 98 F | BODY MASS INDEX: 24.33 KG/M2

## 2025-03-24 DIAGNOSIS — T78.1XXA ALLERGIC REACTION TO FOOD, INITIAL ENCOUNTER: ICD-10-CM

## 2025-03-24 DIAGNOSIS — L29.9 GENERALIZED PRURITUS: ICD-10-CM

## 2025-03-24 DIAGNOSIS — T78.1XXA ALLERGIC REACTION TO FOOD, INITIAL ENCOUNTER: Primary | ICD-10-CM

## 2025-03-24 PROCEDURE — 86003 ALLG SPEC IGE CRUDE XTRC EA: CPT

## 2025-03-24 PROCEDURE — 82785 ASSAY OF IGE: CPT

## 2025-03-24 PROCEDURE — 99214 OFFICE O/P EST MOD 30 MIN: CPT | Performed by: FAMILY MEDICINE

## 2025-03-24 PROCEDURE — 3075F SYST BP GE 130 - 139MM HG: CPT | Performed by: FAMILY MEDICINE

## 2025-03-24 PROCEDURE — 3079F DIAST BP 80-89 MM HG: CPT | Performed by: FAMILY MEDICINE

## 2025-03-24 PROCEDURE — 3008F BODY MASS INDEX DOCD: CPT | Performed by: FAMILY MEDICINE

## 2025-03-24 PROCEDURE — 36415 COLL VENOUS BLD VENIPUNCTURE: CPT

## 2025-03-24 RX ORDER — PREDNISONE 20 MG/1
TABLET ORAL
Qty: 18 TABLET | Refills: 0 | Status: SHIPPED | OUTPATIENT
Start: 2025-03-24

## 2025-03-24 RX ORDER — FAMOTIDINE 20 MG/1
20 TABLET, FILM COATED ORAL 2 TIMES DAILY
Qty: 60 TABLET | Refills: 0 | Status: SHIPPED | OUTPATIENT
Start: 2025-03-24 | End: 2025-04-23

## 2025-03-24 RX ORDER — HYDROXYZINE HYDROCHLORIDE 10 MG/1
10 TABLET, FILM COATED ORAL EVERY 8 HOURS PRN
Qty: 30 TABLET | Refills: 0 | Status: SHIPPED | OUTPATIENT
Start: 2025-03-24 | End: 2025-04-03

## 2025-03-24 NOTE — TELEPHONE ENCOUNTER
Dr. Kelsey, letter pended if appropriate.    Spoke to patient, full name and date of birth verified.  Patient was seen today by Dr. Kelsey - has had a rash all weekend.     Patient forgot to ask for a work note.    Patient is requesting off today and tomorrow, return to work Wednesday.     Patient stated she is able to print from home, okay to send letter via [employer].

## 2025-03-24 NOTE — TELEPHONE ENCOUNTER
Refill Request:    Current Outpatient Medications   Medication Sig Dispense Refill    famotidine (PEPCID) 20 MG Oral Tab Take 1 tablet (20 mg total) by mouth 2 (two) times daily. 180 tablet 0     Please advise

## 2025-03-24 NOTE — TELEPHONE ENCOUNTER
Disp Refills Start End    famotidine (PEPCID) 20 MG Oral Tab 60 tablet 0 3/24/2025 4/23/2025    Sig - Route: Take 1 tablet (20 mg total) by mouth 2 (two) times daily. - Oral    Sent to pharmacy as: Famotidine 20 MG Oral Tablet (Pepcid)    E-Prescribing Status: Receipt confirmed by pharmacy (3/24/2025  9:33 AM CDT)      Associated Diagnoses    Allergic reaction to food, initial encounter  - Primary      Generalized pruritus        Pharmacy    [id number] - [place], [place] - [address] AT [facility] [address] [place] [address], [phone], [phone]

## 2025-03-24 NOTE — TELEPHONE ENCOUNTER
Patient called the office, letter pended and routed to provider under telephone encounter dated 3/24/25.     Encounter closed as duplicate.

## 2025-03-24 NOTE — TELEPHONE ENCOUNTER
Please reply to pool: EM RN TRIAGE  Action Requested: Summary for Provider     []  Critical Lab, Recommendations Needed  [] Need Additional Advice  [x]   FYI    []   Need Orders  [] Need Medications Sent to Pharmacy  []  Other     SUMMARY: Patient contacts clinic reporting hive like rash that began over the weekend.  Small red dots that are itchy to neck, chest and arms.  Denies respiratory difficulty.  Believes her tongue was swollen yesterday but not today.  Taking over the counter antihistamine with minimal relief.  Acute visit scheduled this am, report immediately to emergency room for any lip or tongue involvement.  She verbalized understanding and compliance.     Reason for call: Hives  Onset: Data Unavailable                       Reason for Disposition   MODERATE-SEVERE hives persist (i.e., hives interfere with normal activities or work) and taking antihistamine (e.g., Benadryl, Claritin) > 24 hours    Protocols used: Hives-A-OH

## 2025-03-24 NOTE — TELEPHONE ENCOUNTER
Patient called, letter pended and routed to provider under telephone encounter 3/24/25.   Encounter closed as duplicate.

## 2025-03-26 LAB
ALLERGEN BRAZIL NUT: <0.1 KUA/L (ref ?–0.1)
ALMOND IGE QN: <0.1 KUA/L (ref ?–0.1)
CASHEW NUT IGE QN: <0.1 KUA/L (ref ?–0.1)
CLAM IGE QN: <0.1 KUA/L (ref ?–0.1)
CODFISH IGE QN: <0.1 KUA/L (ref ?–0.1)
CORN IGE QN: <0.1 KUA/L (ref ?–0.1)
COW MILK IGE QN: <0.1 KUA/L (ref ?–0.1)
EGG WHITE IGE QN: <0.1 KUA/L (ref ?–0.1)
GLUTEN IGE QN: <0.1 KUA/L (ref ?–0.1)
HAZELNUT IGE QN: <0.1 KUA/L (ref ?–0.1)
IGE SERPL-ACNC: 39 KU/L (ref 2–214)
PEANUT IGE QN: <0.1 KUA/L (ref ?–0.1)
SALMON IGE QN: <0.1 KUA/L (ref ?–0.1)
SCALLOP IGE QN: <0.1 KUA/L (ref ?–0.1)
SESAME SEED IGE QN: <0.1 KUA/L (ref ?–0.1)
SHRIMP IGE QN: <0.1 KUA/L (ref ?–0.1)
SOYBEAN IGE QN: <0.1 KUA/L (ref ?–0.1)
WALNUT IGE QN: <0.1 KUA/L (ref ?–0.1)
WHEAT IGE QN: <0.1 KUA/L (ref ?–0.1)

## 2025-03-31 LAB
A ALTERNATA IGE QN: <0.1 KUA/L (ref ?–0.1)
C HERBARUM IGE QN: <0.1 KUA/L (ref ?–0.1)
CAT DANDER IGE QN: <0.1 KUA/L (ref ?–0.1)
COMMON RAGWEED IGE QN: <0.1 KUA/L (ref ?–0.1)
D FARINAE IGE QN: 0.37 KUA/L (ref ?–0.1)
DOG DANDER IGE QN: <0.1 KUA/L (ref ?–0.1)
GOOSEFOOT IGE QN: <0.1 KUA/L (ref ?–0.1)
HOUSE DUST HS IGE QN: <0.1 KUA/L (ref ?–0.1)
IGE SERPL-ACNC: 43.1 KU/L (ref 2–214)
KENT BLUE GRASS IGE QN: <0.1 KUA/L (ref ?–0.1)
PER RYE GRASS IGE QN: <0.1 KUA/L (ref ?–0.1)
WHITE ELM IGE QN: <0.1 KUA/L (ref ?–0.1)
WHITE OAK IGE QN: <0.1 KUA/L (ref ?–0.1)

## 2025-04-30 ENCOUNTER — TELEPHONE (OUTPATIENT)
Dept: ENDOCRINOLOGY CLINIC | Facility: CLINIC | Age: 35
End: 2025-04-30

## 2025-04-30 NOTE — TELEPHONE ENCOUNTER
Received eye exam from [facility], exam put in flowsheet & placed in provider folder for review.

## 2025-05-19 ENCOUNTER — OFFICE VISIT (OUTPATIENT)
Dept: ENDOCRINOLOGY CLINIC | Facility: CLINIC | Age: 35
End: 2025-05-19

## 2025-05-19 VITALS
BODY MASS INDEX: 24.94 KG/M2 | WEIGHT: 158.88 LBS | HEART RATE: 67 BPM | DIASTOLIC BLOOD PRESSURE: 75 MMHG | SYSTOLIC BLOOD PRESSURE: 114 MMHG | HEIGHT: 67 IN | RESPIRATION RATE: 18 BRPM

## 2025-05-19 DIAGNOSIS — E11.9 TYPE 2 DIABETES MELLITUS WITHOUT COMPLICATION, WITHOUT LONG-TERM CURRENT USE OF INSULIN (HCC): Primary | ICD-10-CM

## 2025-05-19 DIAGNOSIS — E03.9 ACQUIRED HYPOTHYROIDISM: ICD-10-CM

## 2025-05-19 DIAGNOSIS — E78.2 MIXED HYPERLIPIDEMIA: ICD-10-CM

## 2025-05-19 LAB
GLUCOSE BLOOD: 144
HEMOGLOBIN A1C: 6.4 % (ref 4.3–5.6)
TEST STRIP LOT #: NORMAL NUMERIC

## 2025-05-19 RX ORDER — ACYCLOVIR 400 MG/1
1 TABLET ORAL
Qty: 9 EACH | Refills: 1 | Status: SHIPPED | OUTPATIENT
Start: 2025-05-19

## 2025-05-19 RX ORDER — TIRZEPATIDE 12.5 MG/.5ML
12.5 INJECTION, SOLUTION SUBCUTANEOUS WEEKLY
Qty: 6 ML | Refills: 1 | Status: SHIPPED | OUTPATIENT
Start: 2025-05-19

## 2025-05-19 RX ORDER — METFORMIN HYDROCHLORIDE 500 MG/1
2000 TABLET, EXTENDED RELEASE ORAL DAILY
Qty: 360 TABLET | Refills: 0 | Status: SHIPPED | OUTPATIENT
Start: 2025-05-19

## 2025-05-19 NOTE — PATIENT INSTRUCTIONS
A1C: 6.4% today --> previously was 8.1% on 7/30/2024  Blood glucose: 144 in clinic today    Medications:   - continue with metformin ER 500mg twice daily  - continue with Jardiance 25mg once daily in the morning  - continue with Mounjaro to 12.5 mg once weekly  - continue with Novolog as needed    --> if glucose readings 2hrs after eating are:      >200 = 2 units      >300 = 4 units     - repeat labs - fast for 10hrs prior (water drinking is recommended)       Weight:  Wt Readings from Last 6 Encounters:   05/19/25 158 lb 14.4 oz (72.1 kg)   03/24/25 155 lb (70.3 kg)   07/30/24 165 lb (74.8 kg)   02/13/24 166 lb (75.3 kg)   08/30/23 159 lb 3.2 oz (72.2 kg)   05/30/23 160 lb (72.6 kg)     A1C goal:  <7.0%    Blood sugar testing:  Continue using Dexcom G7 continuous glucometer     Blood sugar targets:  Before breakfast:  (preferably < 110)  Before meals: <150   2 hours after meals: <180 (preferably <150)     Call for persistent blood sugars < 75 or > 200

## 2025-05-19 NOTE — PROGRESS NOTES
-----------------------------  Dexcom Clarity  -----------------------------  Ursula Lorenzo    YOB: 1990    Generated at: Mon, May 19, 2025 8:16 AM CDT    Reporting period: Sun Apr 20, 2025 - Mon May 19, 2025  -----------------------------  Glucose Details    Average glucose: 149 mg/dL    GMI: 6.9%    Standard deviation: 29 mg/dL    Coefficient of Variation: 19.7%  -----------------------------  Time in Range    Very High: 0%    High: 13%    In Range: 87%    Low: 0%    Very Low: <1%    Target Range   mg/dL    -----------------------------  Sensor usage    Days with data: 17/30    Time active: 79%    Avg. calibrations per day: 0.0

## 2025-05-19 NOTE — PROGRESS NOTES
Name: Ursula Lorenzo  Date: 5/19/2025    CHIEF COMPLAINT   Chief Complaint   Patient presents with    Diabetes     Follow-Up       HISTORY OF PRESENT ILLNESS   Ursula Lorenzo is a 34 year old female who presents for follow up on diabetes management.   HbA1C: 6.4% at POC today. Previously was 8.1% on 7/30/2024.   Blood glucose is: 144 in clinic today.   Patient notes that has been feeling well and denies any health changes or taking new medications since last office visit. She continues to follow a low carb diet and staying active per usual. She has been compliant with taking all diabetes medications.     FAMILY HISTORY OF DIABETES  -mother -T2DM  -father - T2DM  -Maternal aunts and grandmother - T2DM  -paternal aunts - T2DM    DIABETES HISTORY  Diagnosed: around 2012- 2013 - no antibodies done at that time    Started Levemir around 4-5 years ago   ANGELO-65: <5 on 9/24/2022  Zinc transported 8 antibody: <10 on 9/24/2022    Prior HbA, C or glycohemoglobin were 9.6% 9/6/2022; 10.6% at POC today; 8.2% 3/28/2023; 6.3% 5/30/2023; 8.6% 2/13/2024; 8.1% 7/30/2024; 6.4% at POC today;     Patient has not had hospitalizations for blood sugar issues.    - hyperglycemia around 2 years ago   Denies any history of pancreatitis.     PREVIOUS MEDICATION FOR DM:  - Glipizide- d/c'ed due to weight gain and other issues does not remember exactly at this time.   - levemir - d/c'ed due to improved glucose readings     CURRENT MEDICATIONS FOR DM:  -Metformin 1,000mg twice daily  -Jardiance 25mg once daily in AM   - Mounjaro 12.5mg once weekly - tolerating well; denies any GI s/e   - Novolog 4 units before dinner - has not been taking     HOME GLUCOSE READINGS:   Dexcom G7 CGM download reviewed  Testing 3-4x daily       Generated at: Mon, May 19, 2025 8:16 AM CDT    Reporting period: Sun Apr 20, 2025 - Mon May 19, 2025  -----------------------------  Glucose Details    Average glucose: 149 mg/dL    GMI: 6.9%    Standard deviation: 29  mg/dL    Coefficient of Variation: 19.7%  -----------------------------  Time in Range    Very High: 0%    High: 13%    In Range: 87%    Low: 0%    Very Low: <1%     Target Range   mg/dL     -----------------------------  Sensor usage    Days with data: 17/30    Time active: 79%    Avg. calibrations per day: 0.0      Continuous Glucose Monitoring Interpretation    Ursula Lorenzo has undergone continuous glucose monitoring with the personal Dexcom G7 continuous glucose monitor. The blood glucose tracings were evaluated for two weeks prior to office visit. Her blood glucose tracings demonstrated overall stable glucose patterns that are mostly within target range with some occasional postprandial hyperglycemia. She did experience hypoglycemia during the week of evaluation but the two occurences were consistent with compression low glucose reading.     Hypoglycemia awareness: N/A -has never had an episode     HISTORY OF DIABETES COMPLICATIONS:  History of Retinopathy: no  - last eye exam within the last 12 months: yes -- last exam was 4/2025 with [facility]   History of Neuropathy: no   History of Nephropathy: no     ASSOCIATED COMPLICATIONS:   HTN: no   Hyperlipidemia: no   Cardiovascular Disease: no   Peripheral Vascular Disease: no     DIETARY COMPLIANCE:  Good; following a low carb diet     EXERCISE:   Yes - walking 4-5x weekly around 2 miles     Polyuria, polyphagia, polydipsia: no   Paresthesias: no   Blurred vision: no   Recent steroids, illness or infections: no     REVIEW OF SYSTEMS  Constitutional: Negative for: weight change, fever, fatigue, cold/heat intolerance  Eyes: Negative for:  Visual changes, proptosis, blurring  ENT: Negative for:  dysphagia, neck swelling, dysphonia  Respiratory: Negative for: hemoptysis, shortness of breath, cough, or dyspnea.  Cardiovascular: Negative for:  chest pain, chest discomfort, palpitations  GI: Negative for:  abdominal pain, nausea, vomiting, diarrhea,  heartburn, constipation  Neurology: Negative for: headache, dizziness, syncope, numbness/tingling, or weakness.   Genito-Urinary: Negative for: dysuria, frequency or hematuria   Hematology/Lymphatics: Negative for: bruising, easy bleeding, lower extremity edema  Skin: Negative for: rash, blister, infection or ulcers.  Endocrine: Negative for: polyuria, polydipsia. No osteoporosis.   Thyroid disease: yes - diagnosed around 1320-7482 ago. Currently maintained on Levothyroxine 88mcg once daily in AM.      MEDICATIONS:     Current Outpatient Medications:     predniSONE 20 MG Oral Tab, 3 tabs by mouth all at same time for 3 days,2 tabs by mouth at same time for 3 days,1 tab each day for 3 days.(Best w/ breakfast/lunch), Disp: 18 tablet, Rfl: 0    DEXCOM G7 SENSOR Does not apply Misc, CHANGE SENSOR EVERY 10 DAYS, Disp: 9 each, Rfl: 1    Tirzepatide (MOUNJARO) 12.5 MG/0.5ML Subcutaneous Solution Auto-injector, Inject 12.5 mg into the skin once a week., Disp: 6 mL, Rfl: 1    LEVOTHYROXINE 88 MCG Oral Tab, TAKE 1 TABLET(88 MCG) BY MOUTH BEFORE BREAKFAST, Disp: 90 tablet, Rfl: 0    JARDIANCE 25 MG Oral Tab, TAKE 1 TABLET BY MOUTH DAILY, Disp: 90 tablet, Rfl: 1    rosuvastatin 20 MG Oral Tab, Take 1 tablet (20 mg total) by mouth nightly., Disp: 90 tablet, Rfl: 0    insulin aspart (NOVOLOG FLEXPEN) 100 Units/mL Subcutaneous Solution Pen-injector, Inject 4 Units into the skin daily with dinner., Disp: 6 mL, Rfl: 0    TRUEPLUS 5-BEVEL PEN NEEDLES 31G X 5 MM Does not apply Misc, Use daily to inject insulin with dinner meal, Disp: 100 each, Rfl: 1    METFORMIN  MG Oral Tablet 24 Hr, TAKE 4 TABLETS(2000 MG) BY MOUTH DAILY, Disp: 360 tablet, Rfl: 0    docusate sodium 100 MG Oral Cap, Take 1 capsule (100 mg total) by mouth 2 (two) times daily., Disp: 180 capsule, Rfl: 4    Blood Glucose Monitoring Suppl (ONETOUCH VERIO) w/Device Does not apply Kit, 1 each daily. Test 1x daily, Disp: 1 kit, Rfl: 0    Glucose Blood (ONETOUCH  VERIO) In Vitro Strip, Test 1x daily, Disp: 100 strip, Rfl: 1    OneTouch Delica Lancets 33G Does not apply Misc, Test 1x daily, Disp: 100 each, Rfl: 1    omeprazole 20 MG Oral Capsule Delayed Release, Take 1 capsule (20 mg total) by mouth every evening., Disp: 90 capsule, Rfl: 1    ALLERGIES:   No Known Allergies    SOCIAL HISTORY:   Social History     Socioeconomic History    Marital status:    Tobacco Use    Smoking status: Never    Smokeless tobacco: Never   Vaping Use    Vaping status: Some Days    Substances: THC   Substance and Sexual Activity    Alcohol use: Yes    Drug use: Yes     Types: Cannabis       PAST MEDICAL HISTORY:   Past Medical History:    Anemia    Diabetes (HCC)    approx 2012    Hypothyroidism       PAST SURGICAL HISTORY:   Past Surgical History:   Procedure Laterality Date    Other surgical history      LT wrist fx       PHYSICAL EXAM:   Vitals:    05/19/25 1501   BP: 114/75   BP Location: Left arm   Pulse: 67   Resp: 18   Weight: 158 lb 14.4 oz (72.1 kg)   Height: 5' 7\" (1.702 m)       BMI:   Body mass index is 24.28 kg/m².    General Appearance:  alert, well developed, in no acute distress  Nutritional:  no extreme weight gain or loss  Head: Atraumatic  Eyes:  normal conjunctivae, sclera., normal sclera and normal pupils  Throat/Neck: normal sound to voice. Normal hearing, normal speech  Back: no kyphosis  Respiratory:  Speaking in full sentences, non-labored. no increased work of breathing, no audible wheezing    Skin:  normal moisture and skin texture, no visible lesions  Hair and nails: normal scalp hair  Hematologic:  no excessive bruising  Neuro: motor grossly intact, moving all extremities without difficulty  Psychiatric:  oriented to time, self, and place  Extremities: no obvious extremity swelling, no lesions    Diabetes Foot Exam:  Bilateral barefoot skin diabetic exam is normal, visualized feet and the appearance is normal.  Bilateral monofilament/sensation of both feet is  normal.  Pulsation pedal pulse exam of both lower legs/feet is normal as well.    LABS: Pertinent labs reviewed    ASSESSMENT/PLAN:    -Reviewed with patient the pathogenesis of diabetes, clinical significance of A1c, and common complications such as: microvascular, macrovascular and diabetic ketoacidosis. Patient verbalizes understanding of the importance of glycemic control and the goals of therapy.   -Discussed with patient glucose targets ranges (Fasting  and post prandial <180).     1.Type 2 Diabetes Mellitus, controlled  -LAB DATA  HbA,C: 6.4% today   a) Medications  - continue with metformin ER 500mg twice daily  - continue with Jardiance 25mg once daily in the morning  - continue with Mounjaro to 12.5 mg once weekly  - continue with Novolog as needed    --> if glucose readings 2hrs after eating are:      >200 = inject 2 units      >300 = inject 4 units     -discussed getting back on track with following a low carb diet   -Continue to stay active-goal is at least 150 minutes/week  -Discussed importance of hydration. Patient advised to drink 1-2 glasses additional of water than usually to avoid dehydration while on Jardiance.   -reviewed target goal BG readings and A1C  -reviewed when to call and notify me of abnormal BG readings.     b) No nephropathy: 101 on 3/9/2024 and urine MA: 4 on 22 --> repeat labs   c) UTD with optho - last exam was 2025 with [facility]   D) foot exam: normal today 2025  e) cont. with Dexcom G7 CGM  f) Life style changes reviewed.     2. Blood Pressure Management   -normotensive today     3.hyperlipidemia   -LDL: 112 and tri on 10/27/2023  -reviewed ADA guidelines of LDL goal <70  - repeat lipid panel       4. Hypothyroidism  -reviewed nonspecific symptoms of thyroid disease  -Reviewed labs and TSH level is at goal on 3/9/2024  - continue with levothyroxine 88mcg once daily   - repeat thyroid labs       RTC in 3 months   Patient instructed to call sooner if  they develop Blood glucose readings <75 and/or if they have readings persistently >200.     The risks and benefits of my recommendations were discussed with the patient today. questions were also answered to the best of my knowledge. Patient verbalizes understanding of these issues and agrees to the plan.    5/19/2025  MOY Elizalde

## 2025-06-24 ENCOUNTER — OFFICE VISIT (OUTPATIENT)
Dept: OBGYN CLINIC | Facility: CLINIC | Age: 35
End: 2025-06-24
Payer: COMMERCIAL

## 2025-06-24 VITALS
HEART RATE: 75 BPM | SYSTOLIC BLOOD PRESSURE: 112 MMHG | DIASTOLIC BLOOD PRESSURE: 71 MMHG | BODY MASS INDEX: 25 KG/M2 | WEIGHT: 160 LBS

## 2025-06-24 DIAGNOSIS — Z01.419 ENCOUNTER FOR GYNECOLOGICAL EXAMINATION: Primary | ICD-10-CM

## 2025-06-24 DIAGNOSIS — Z31.69 PROCREATIVE MANAGEMENT COUNSELING: ICD-10-CM

## 2025-06-24 DIAGNOSIS — Z12.4 SCREENING FOR CERVICAL CANCER: ICD-10-CM

## 2025-06-24 PROCEDURE — 3078F DIAST BP <80 MM HG: CPT | Performed by: OBSTETRICS & GYNECOLOGY

## 2025-06-24 PROCEDURE — 3074F SYST BP LT 130 MM HG: CPT | Performed by: OBSTETRICS & GYNECOLOGY

## 2025-06-24 PROCEDURE — 99395 PREV VISIT EST AGE 18-39: CPT | Performed by: OBSTETRICS & GYNECOLOGY

## 2025-06-24 NOTE — PROGRESS NOTES
The following individual(s) verbally consented to be recorded using ambient AI listening technology and understand that they can each withdraw their consent to this listening technology at any point by asking the clinician to turn off or pause the recording:    Patient name: Ursuladom VillaltaLorenzo  Additional names:

## 2025-06-25 LAB — HPV E6+E7 MRNA CVX QL NAA+PROBE: NEGATIVE

## 2025-06-25 NOTE — PROGRESS NOTES
Ursula Lorenzo is a 34 year old female  Patient's last menstrual period was 06/15/2025 (exact date). here for annual exam.       History of Present Illness  Ursula Lorenzo is a 34 year old female with type 2 diabetes mellitus who presents for an annual physical exam.  Last seen 22    Last pap 2022 normal with negative HPV    She has a 13-year history of type 2 diabetes mellitus, currently managed with metformin, Mounjaro, and Jardiance. Her last hemoglobin A1c was 6.4%. She uses a Dexcom for continuous glucose monitoring.    Menses q month.   LMP 6/15/25.   No contraception. Wants to get pregnancy.      She experiences stress due to balancing work and part-time schooling.   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    No changes with health    OBSTETRICS HISTORY:  OB History    Para Term  AB Living   1 0 0 0 1 0   SAB IAB Ectopic Multiple Live Births   0 1 0 0 0       GYNE HISTORY   Pap Date: 22  Pap Result Notes: PAP/HPV NEG  Follow Up Recommendation: ANNUAL 22 JLK    MEDICAL HISTORY:  Past Medical History:    Anemia    Diabetes (HCC)    approx     Hypothyroidism     Past Surgical History:   Procedure Laterality Date    Other surgical history      LT wrist fx       SOCIAL HISTORY:  Social History     Socioeconomic History    Marital status:    Tobacco Use    Smoking status: Never    Smokeless tobacco: Never   Vaping Use    Vaping status: Some Days    Substances: THC   Substance and Sexual Activity    Alcohol use: Yes    Drug use: Yes     Types: Cannabis       FAMILY HISTORY:  Family History   Problem Relation Age of Onset    Lipids Father     Hypertension Mother     Diabetes Maternal Grandmother     Diabetes Paternal Grandmother     Cancer Paternal Grandmother         colon       MEDICATIONS:  Current Outpatient Medications   Medication Sig Dispense Refill    empagliflozin (JARDIANCE) 25 MG Oral Tab Take 1 tablet (25 mg total) by  mouth daily. 90 tablet 1    metFORMIN  MG Oral Tablet 24 Hr Take 4 tablets (2,000 mg total) by mouth daily. 360 tablet 0    Tirzepatide (MOUNJARO) 12.5 MG/0.5ML Subcutaneous Solution Auto-injector Inject 12.5 mg into the skin once a week. 6 mL 1    Continuous Glucose Sensor (DEXCOM G7 SENSOR) Does not apply Misc 1 each Every 10 days. CHANGE SENSOR EVERY 10 DAYS 9 each 1    LEVOTHYROXINE 88 MCG Oral Tab TAKE 1 TABLET(88 MCG) BY MOUTH BEFORE BREAKFAST 90 tablet 0    rosuvastatin 20 MG Oral Tab Take 1 tablet (20 mg total) by mouth nightly. 90 tablet 0    insulin aspart (NOVOLOG FLEXPEN) 100 Units/mL Subcutaneous Solution Pen-injector Inject 4 Units into the skin daily with dinner. 6 mL 0    TRUEPLUS 5-BEVEL PEN NEEDLES 31G X 5 MM Does not apply Misc Use daily to inject insulin with dinner meal 100 each 1    docusate sodium 100 MG Oral Cap Take 1 capsule (100 mg total) by mouth 2 (two) times daily. 180 capsule 4    Blood Glucose Monitoring Suppl (ONETOUCH VERIO) w/Device Does not apply Kit 1 each daily. Test 1x daily 1 kit 0    Glucose Blood (ONETOUCH VERIO) In Vitro Strip Test 1x daily 100 strip 1    OneTouch Delica Lancets 33G Does not apply Misc Test 1x daily 100 each 1    omeprazole 20 MG Oral Capsule Delayed Release Take 1 capsule (20 mg total) by mouth every evening. 90 capsule 1       ALLERGIES:  No Known Allergies      Depression Screening (PHQ-2/PHQ-9): Over the LAST 2 WEEKS   Little interest or pleasure in doing things (over the last two weeks)?: Several days    Feeling down, depressed, or hopeless (over the last two weeks)?: Several days    PHQ-2 SCORE: 2   1.    2.    3.    4.    5.    6.    7.    8.    9.                 Review of Systems:  Constitutional:  Denies fatigue, night sweats, hot flashes  Eyes:  denies blurred or double vision  Cardiovascular:  denies chest pain or palpitations  Respiratory:  denies shortness of breath  Gastrointestinal:  denies heartburn, abdominal pain, diarrhea or  constipation  Genitourinary:  denies dysuria, incontinence, abnormal vaginal discharge, vaginal itching  Musculoskeletal:  denies back pain.  Skin/Breast:  Denies any breast pain, lumps, or discharge.   Neurological:  denies headaches, extremity weakness or numbness.  Psychiatric: denies depression or anxiety.  Endocrine:   denies excessive thirst or urination.  Heme/Lymph:  easy bruising or bleeding.    PHYSICAL EXAM:   /71   Pulse 75   Wt 160 lb (72.6 kg)   LMP 06/15/2025 (Exact Date)   BMI 25.06 kg/m²   Wt Readings from Last 2 Encounters:   06/24/25 160 lb (72.6 kg)   05/19/25 158 lb 14.4 oz (72.1 kg)     Body mass index is 25.06 kg/m².    Constitutional: well developed, well nourished  Neck/Thyroid: thyroid symmetric, no nodules  Heart:  Regular rate and rhythm  Lungs:  Clear to asculation  Breast: normal without palpable masses, tenderness, asymmetry, nipple discharge, nipple retraction or skin changes  Abdomen:  soft, nontender, nondistended, no masses  Psychiatric:  Oriented to time, place, person and situation. Appropriate mood and affect    Pelvic Exam:  External Genitalia: normal appearance, hair distribution, and no lesions  Urethral Meatus:  normal in size, location, without lesions and prolapse  Bladder:  No fullness, masses or tenderness  Vagina:  Normal appearance without lesions, no abnormal discharge  Cervix:  Normal without tenderness on motion  Uterus: normal in size, contour, position, mobility, without tenderness  Adnexa: normal without masses or tenderness  Perineum/anus: normal      Assessment & Plan:    Ursula Lorenzo is a 34 year old female who presents for an annual physical exam.    1. Encounter for gynecological examination  Pap and HPV.   Will do Pap/HPV q 3 years.   Annual exams encouraged.    RTC 1 year or prn     2. Procreative counseling  Recommend stopping DM meds and start insulin.  Discussed checking BS QID.  Discussed strict control while pregnant.  Recommend TSH to  be <3.          Requested Prescriptions      No prescriptions requested or ordered in this encounter         Gemma Gage MD  6/25/2025  7:52 AM

## (undated) NOTE — LETTER
3/24/2025          To Whom It May Concern:    Ursula Lorenzo is currently under my medical care and may not return to work at this time.    Please excuse Ursula for 2 days.  She may return to work on 3/26/25..  Activity is restricted as follows: none.    If you require additional information please contact our office.        Sincerely,     Olvin eKlsey, DO          Document generated by:  Dahlia SAHA RN